# Patient Record
Sex: MALE | Race: WHITE | NOT HISPANIC OR LATINO | Employment: FULL TIME | URBAN - NONMETROPOLITAN AREA
[De-identification: names, ages, dates, MRNs, and addresses within clinical notes are randomized per-mention and may not be internally consistent; named-entity substitution may affect disease eponyms.]

---

## 2018-01-02 ENCOUNTER — OFFICE VISIT CONVERTED (OUTPATIENT)
Dept: FAMILY MEDICINE CLINIC | Age: 48
End: 2018-01-02
Attending: NURSE PRACTITIONER

## 2018-06-28 ENCOUNTER — OFFICE VISIT CONVERTED (OUTPATIENT)
Dept: FAMILY MEDICINE CLINIC | Age: 48
End: 2018-06-28
Attending: NURSE PRACTITIONER

## 2018-09-24 ENCOUNTER — OFFICE VISIT CONVERTED (OUTPATIENT)
Dept: FAMILY MEDICINE CLINIC | Age: 48
End: 2018-09-24
Attending: FAMILY MEDICINE

## 2018-12-07 ENCOUNTER — OFFICE VISIT CONVERTED (OUTPATIENT)
Dept: FAMILY MEDICINE CLINIC | Age: 48
End: 2018-12-07
Attending: FAMILY MEDICINE

## 2018-12-13 ENCOUNTER — OFFICE VISIT CONVERTED (OUTPATIENT)
Dept: FAMILY MEDICINE CLINIC | Age: 48
End: 2018-12-13
Attending: NURSE PRACTITIONER

## 2019-03-21 ENCOUNTER — OFFICE VISIT CONVERTED (OUTPATIENT)
Dept: FAMILY MEDICINE CLINIC | Age: 49
End: 2019-03-21
Attending: NURSE PRACTITIONER

## 2019-04-13 ENCOUNTER — HOSPITAL ENCOUNTER (OUTPATIENT)
Dept: OTHER | Facility: HOSPITAL | Age: 49
Discharge: HOME | End: 2019-04-13

## 2019-04-13 LAB
ALBUMIN SERPL-MCNC: 4.4 G/DL (ref 3.5–5)
ALBUMIN/GLOB SERPL: 1.5 {RATIO} (ref 1.4–2.6)
ALP SERPL-CCNC: 75 U/L (ref 53–128)
ALT SERPL-CCNC: 74 U/L (ref 10–40)
ANION GAP SERPL CALC-SCNC: 17 MMOL/L (ref 8–19)
AST SERPL-CCNC: 39 U/L (ref 15–50)
BASOPHILS # BLD MANUAL: 0.01 10*3/UL (ref 0–0.2)
BASOPHILS NFR BLD MANUAL: 0.2 % (ref 0–3)
BILIRUB SERPL-MCNC: 0.73 MG/DL (ref 0.2–1.3)
BUN SERPL-MCNC: 13 MG/DL (ref 5–25)
BUN/CREAT SERPL: 14 {RATIO} (ref 6–20)
CALCIUM SERPL-MCNC: 9.1 MG/DL (ref 8.7–10.4)
CHLORIDE SERPL-SCNC: 99 MMOL/L (ref 99–111)
CHOLEST SERPL-MCNC: 204 MG/DL (ref 107–200)
CHOLEST/HDLC SERPL: 4 {RATIO} (ref 3–6)
CONV CO2: 26 MMOL/L (ref 22–32)
CONV TOTAL PROTEIN: 7.3 G/DL (ref 6.3–8.2)
CREAT UR-MCNC: 0.9 MG/DL (ref 0.7–1.2)
DEPRECATED RDW RBC AUTO: 38.8 FL
EOSINOPHIL # BLD MANUAL: 0.07 10*3/UL (ref 0–0.7)
EOSINOPHIL NFR BLD MANUAL: 1.5 % (ref 0–7)
ERYTHROCYTE [DISTWIDTH] IN BLOOD BY AUTOMATED COUNT: 12.6 % (ref 11.5–14.5)
EST. AVERAGE GLUCOSE BLD GHB EST-MCNC: 169 MG/DL
GFR SERPLBLD BASED ON 1.73 SQ M-ARVRAT: >60 ML/MIN/{1.73_M2}
GLOBULIN UR ELPH-MCNC: 2.9 G/DL (ref 2–3.5)
GLUCOSE SERPL-MCNC: 174 MG/DL (ref 70–99)
GRANS (ABSOLUTE): 2.26 10*3/UL (ref 2–8)
GRANS: 47.4 % (ref 30–85)
HBA1C MFR BLD: 14 G/DL (ref 14–18)
HBA1C MFR BLD: 7.5 % (ref 3.5–5.7)
HCT VFR BLD AUTO: 41.8 % (ref 42–52)
HDLC SERPL-MCNC: 51 MG/DL (ref 40–60)
IMM GRANULOCYTES # BLD: 0.02 10*3/UL (ref 0–0.54)
IMM GRANULOCYTES NFR BLD: 0.4 % (ref 0–0.43)
LDLC SERPL CALC-MCNC: 124 MG/DL (ref 70–100)
LYMPHOCYTES # BLD MANUAL: 1.94 10*3/UL (ref 1–5)
LYMPHOCYTES NFR BLD MANUAL: 9.7 % (ref 3–10)
MCH RBC QN AUTO: 28.3 PG (ref 27–31)
MCHC RBC AUTO-ENTMCNC: 33.5 G/DL (ref 33–37)
MCV RBC AUTO: 84.4 FL (ref 80–96)
MONOCYTES # BLD AUTO: 0.46 10*3/UL (ref 0.2–1.2)
OSMOLALITY SERPL CALC.SUM OF ELEC: 288 MOSM/KG (ref 273–304)
PLATELET # BLD AUTO: 243 10*3/UL (ref 130–400)
PMV BLD AUTO: 11 FL (ref 7.4–10.4)
POTASSIUM SERPL-SCNC: 4.5 MMOL/L (ref 3.5–5.3)
RBC # BLD AUTO: 4.95 10*6/UL (ref 4.7–6.1)
SODIUM SERPL-SCNC: 137 MMOL/L (ref 135–147)
TRIGL SERPL-MCNC: 146 MG/DL (ref 40–150)
TSH SERPL-ACNC: 1.6 M[IU]/L (ref 0.27–4.2)
VARIANT LYMPHS NFR BLD MANUAL: 40.8 % (ref 20–45)
VLDLC SERPL-MCNC: 29 MG/DL (ref 5–37)
WBC # BLD AUTO: 4.76 10*3/UL (ref 4.8–10.8)

## 2019-07-30 ENCOUNTER — OFFICE VISIT CONVERTED (OUTPATIENT)
Dept: FAMILY MEDICINE CLINIC | Age: 49
End: 2019-07-30
Attending: NURSE PRACTITIONER

## 2019-08-28 ENCOUNTER — HOSPITAL ENCOUNTER (OUTPATIENT)
Dept: OTHER | Facility: HOSPITAL | Age: 49
Discharge: HOME | End: 2019-08-28

## 2019-08-28 LAB
ALBUMIN SERPL-MCNC: 4.5 G/DL (ref 3.5–5)
ALBUMIN/GLOB SERPL: 1.9 {RATIO} (ref 1.4–2.6)
ALP SERPL-CCNC: 92 U/L (ref 53–128)
ALT SERPL-CCNC: 74 U/L (ref 10–40)
ANION GAP SERPL CALC-SCNC: 19 MMOL/L (ref 8–19)
AST SERPL-CCNC: 40 U/L (ref 15–50)
BASOPHILS # BLD MANUAL: 0.03 10*3/UL (ref 0–0.2)
BASOPHILS NFR BLD MANUAL: 0.5 % (ref 0–3)
BILIRUB SERPL-MCNC: 0.3 MG/DL (ref 0.2–1.3)
BUN SERPL-MCNC: 14 MG/DL (ref 5–25)
BUN/CREAT SERPL: 19 {RATIO} (ref 6–20)
CALCIUM SERPL-MCNC: 9.3 MG/DL (ref 8.7–10.4)
CHLORIDE SERPL-SCNC: 102 MMOL/L (ref 99–111)
CHOLEST SERPL-MCNC: 177 MG/DL (ref 107–200)
CHOLEST/HDLC SERPL: 3.5 {RATIO} (ref 3–6)
CONV CO2: 23 MMOL/L (ref 22–32)
CONV CREATININE URINE, RANDOM: 141.7 MG/DL (ref 10–300)
CONV MICROALBUM.,U,RANDOM: <12 MG/L (ref 0–20)
CONV TOTAL PROTEIN: 6.9 G/DL (ref 6.3–8.2)
CREAT UR-MCNC: 0.74 MG/DL (ref 0.7–1.2)
DEPRECATED RDW RBC AUTO: 40.1 FL
EOSINOPHIL # BLD MANUAL: 0.09 10*3/UL (ref 0–0.7)
EOSINOPHIL NFR BLD MANUAL: 1.6 % (ref 0–7)
ERYTHROCYTE [DISTWIDTH] IN BLOOD BY AUTOMATED COUNT: 13 % (ref 11.5–14.5)
EST. AVERAGE GLUCOSE BLD GHB EST-MCNC: 186 MG/DL
GFR SERPLBLD BASED ON 1.73 SQ M-ARVRAT: >60 ML/MIN/{1.73_M2}
GLOBULIN UR ELPH-MCNC: 2.4 G/DL (ref 2–3.5)
GLUCOSE SERPL-MCNC: 186 MG/DL (ref 70–99)
GRANS (ABSOLUTE): 2.38 10*3/UL (ref 2–8)
GRANS: 41.8 % (ref 30–85)
HBA1C MFR BLD: 13.9 G/DL (ref 14–18)
HBA1C MFR BLD: 8.1 % (ref 3.5–5.7)
HCT VFR BLD AUTO: 41.4 % (ref 42–52)
HDLC SERPL-MCNC: 51 MG/DL (ref 40–60)
IMM GRANULOCYTES # BLD: 0.02 10*3/UL (ref 0–0.54)
IMM GRANULOCYTES NFR BLD: 0.4 % (ref 0–0.43)
LDLC SERPL CALC-MCNC: 97 MG/DL (ref 70–100)
LYMPHOCYTES # BLD MANUAL: 2.56 10*3/UL (ref 1–5)
LYMPHOCYTES NFR BLD MANUAL: 10.6 % (ref 3–10)
MCH RBC QN AUTO: 28 PG (ref 27–31)
MCHC RBC AUTO-ENTMCNC: 33.6 G/DL (ref 33–37)
MCV RBC AUTO: 83.3 FL (ref 80–96)
MICROALBUMIN/CREAT UR: 8.5 MG/G{CRE} (ref 0–25)
MONOCYTES # BLD AUTO: 0.6 10*3/UL (ref 0.2–1.2)
OSMOLALITY SERPL CALC.SUM OF ELEC: 293 MOSM/KG (ref 273–304)
PLATELET # BLD AUTO: 245 10*3/UL (ref 130–400)
PMV BLD AUTO: 11.7 FL (ref 7.4–10.4)
POTASSIUM SERPL-SCNC: 4.5 MMOL/L (ref 3.5–5.3)
RBC # BLD AUTO: 4.97 10*6/UL (ref 4.7–6.1)
SODIUM SERPL-SCNC: 139 MMOL/L (ref 135–147)
TRIGL SERPL-MCNC: 144 MG/DL (ref 40–150)
TSH SERPL-ACNC: 2.81 M[IU]/L (ref 0.27–4.2)
VARIANT LYMPHS NFR BLD MANUAL: 45.1 % (ref 20–45)
VLDLC SERPL-MCNC: 29 MG/DL (ref 5–37)
WBC # BLD AUTO: 5.68 10*3/UL (ref 4.8–10.8)

## 2019-12-10 ENCOUNTER — OFFICE VISIT CONVERTED (OUTPATIENT)
Dept: FAMILY MEDICINE CLINIC | Age: 49
End: 2019-12-10
Attending: NURSE PRACTITIONER

## 2019-12-13 ENCOUNTER — HOSPITAL ENCOUNTER (OUTPATIENT)
Dept: OTHER | Facility: HOSPITAL | Age: 49
Discharge: HOME | End: 2019-12-13
Attending: NURSE PRACTITIONER

## 2019-12-13 LAB
ALBUMIN SERPL-MCNC: 4.4 G/DL (ref 3.5–5)
ALBUMIN/GLOB SERPL: 1.7 {RATIO} (ref 1.4–2.6)
ALP SERPL-CCNC: 77 U/L (ref 53–128)
ALT SERPL-CCNC: 97 U/L (ref 10–40)
ANION GAP SERPL CALC-SCNC: 19 MMOL/L (ref 8–19)
AST SERPL-CCNC: 45 U/L (ref 15–50)
BILIRUB SERPL-MCNC: 0.47 MG/DL (ref 0.2–1.3)
BUN SERPL-MCNC: 13 MG/DL (ref 5–25)
BUN/CREAT SERPL: 17 {RATIO} (ref 6–20)
CALCIUM SERPL-MCNC: 9.4 MG/DL (ref 8.7–10.4)
CHLORIDE SERPL-SCNC: 101 MMOL/L (ref 99–111)
CHOLEST SERPL-MCNC: 177 MG/DL (ref 107–200)
CHOLEST/HDLC SERPL: 3.6 {RATIO} (ref 3–6)
CONV CO2: 23 MMOL/L (ref 22–32)
CONV TOTAL PROTEIN: 7 G/DL (ref 6.3–8.2)
CREAT UR-MCNC: 0.76 MG/DL (ref 0.7–1.2)
EST. AVERAGE GLUCOSE BLD GHB EST-MCNC: 197 MG/DL
GFR SERPLBLD BASED ON 1.73 SQ M-ARVRAT: >60 ML/MIN/{1.73_M2}
GLOBULIN UR ELPH-MCNC: 2.6 G/DL (ref 2–3.5)
GLUCOSE SERPL-MCNC: 212 MG/DL (ref 70–99)
HBA1C MFR BLD: 8.5 % (ref 3.5–5.7)
HDLC SERPL-MCNC: 49 MG/DL (ref 40–60)
LDLC SERPL CALC-MCNC: 107 MG/DL (ref 70–100)
OSMOLALITY SERPL CALC.SUM OF ELEC: 292 MOSM/KG (ref 273–304)
POTASSIUM SERPL-SCNC: 4.8 MMOL/L (ref 3.5–5.3)
SODIUM SERPL-SCNC: 138 MMOL/L (ref 135–147)
TRIGL SERPL-MCNC: 103 MG/DL (ref 40–150)
VLDLC SERPL-MCNC: 21 MG/DL (ref 5–37)

## 2020-10-30 ENCOUNTER — OFFICE VISIT CONVERTED (OUTPATIENT)
Dept: FAMILY MEDICINE CLINIC | Age: 50
End: 2020-10-30
Attending: NURSE PRACTITIONER

## 2020-10-30 ENCOUNTER — HOSPITAL ENCOUNTER (OUTPATIENT)
Dept: OTHER | Facility: HOSPITAL | Age: 50
Discharge: HOME | End: 2020-10-30
Attending: NURSE PRACTITIONER

## 2020-11-04 LAB — SARS-COV-2 RNA SPEC QL NAA+PROBE: DETECTED

## 2020-11-09 ENCOUNTER — HOSPITAL ENCOUNTER (OUTPATIENT)
Dept: OTHER | Facility: HOSPITAL | Age: 50
Discharge: HOME | End: 2020-11-09
Attending: FAMILY MEDICINE

## 2020-11-12 LAB — SARS-COV-2 RNA SPEC QL NAA+PROBE: NOT DETECTED

## 2020-11-13 ENCOUNTER — HOSPITAL ENCOUNTER (OUTPATIENT)
Dept: OTHER | Facility: HOSPITAL | Age: 50
Discharge: HOME | End: 2020-11-13
Attending: NURSE PRACTITIONER

## 2020-11-13 ENCOUNTER — OFFICE VISIT CONVERTED (OUTPATIENT)
Dept: FAMILY MEDICINE CLINIC | Age: 50
End: 2020-11-13
Attending: NURSE PRACTITIONER

## 2020-11-13 LAB
25(OH)D3 SERPL-MCNC: 37.1 NG/ML (ref 30–100)
ALBUMIN SERPL-MCNC: 3.8 G/DL (ref 3.5–5)
ALBUMIN/GLOB SERPL: 1.3 {RATIO} (ref 1.4–2.6)
ALP SERPL-CCNC: 87 U/L (ref 53–128)
ALT SERPL-CCNC: 97 U/L (ref 10–40)
ANION GAP SERPL CALC-SCNC: 16 MMOL/L (ref 8–19)
AST SERPL-CCNC: 55 U/L (ref 15–50)
BILIRUB SERPL-MCNC: 0.39 MG/DL (ref 0.2–1.3)
BUN SERPL-MCNC: 7 MG/DL (ref 5–25)
BUN/CREAT SERPL: 10 {RATIO} (ref 6–20)
CALCIUM SERPL-MCNC: 9.6 MG/DL (ref 8.7–10.4)
CHLORIDE SERPL-SCNC: 102 MMOL/L (ref 99–111)
CHOLEST SERPL-MCNC: 132 MG/DL (ref 107–200)
CHOLEST/HDLC SERPL: 3.5 {RATIO} (ref 3–6)
CONV CO2: 25 MMOL/L (ref 22–32)
CONV CREATININE URINE, RANDOM: 189.4 MG/DL (ref 10–300)
CONV MICROALBUM.,U,RANDOM: <12 MG/L (ref 0–20)
CONV TOTAL PROTEIN: 6.8 G/DL (ref 6.3–8.2)
CREAT UR-MCNC: 0.69 MG/DL (ref 0.7–1.2)
EST. AVERAGE GLUCOSE BLD GHB EST-MCNC: 278 MG/DL
GFR SERPLBLD BASED ON 1.73 SQ M-ARVRAT: >60 ML/MIN/{1.73_M2}
GLOBULIN UR ELPH-MCNC: 3 G/DL (ref 2–3.5)
GLUCOSE SERPL-MCNC: 171 MG/DL (ref 70–99)
HBA1C MFR BLD: 11.3 % (ref 3.5–5.7)
HDLC SERPL-MCNC: 38 MG/DL (ref 40–60)
LDLC SERPL CALC-MCNC: 71 MG/DL (ref 70–100)
MICROALBUMIN/CREAT UR: 6.3 MG/G{CRE} (ref 0–25)
OSMOLALITY SERPL CALC.SUM OF ELEC: 288 MOSM/KG (ref 273–304)
POTASSIUM SERPL-SCNC: 4.6 MMOL/L (ref 3.5–5.3)
SODIUM SERPL-SCNC: 138 MMOL/L (ref 135–147)
TRIGL SERPL-MCNC: 116 MG/DL (ref 40–150)
VLDLC SERPL-MCNC: 23 MG/DL (ref 5–37)

## 2021-01-04 ENCOUNTER — HOSPITAL ENCOUNTER (OUTPATIENT)
Dept: OTHER | Facility: HOSPITAL | Age: 51
Discharge: HOME | End: 2021-01-04
Attending: NURSE PRACTITIONER

## 2021-01-05 ENCOUNTER — OFFICE VISIT CONVERTED (OUTPATIENT)
Dept: FAMILY MEDICINE CLINIC | Age: 51
End: 2021-01-05

## 2021-01-21 ENCOUNTER — HOSPITAL ENCOUNTER (OUTPATIENT)
Dept: OTHER | Facility: HOSPITAL | Age: 51
Discharge: HOME | End: 2021-01-21
Attending: NURSE PRACTITIONER

## 2021-02-24 ENCOUNTER — HOSPITAL ENCOUNTER (OUTPATIENT)
Dept: OTHER | Facility: HOSPITAL | Age: 51
Discharge: HOME | End: 2021-02-24
Attending: NURSE PRACTITIONER

## 2021-02-24 ENCOUNTER — OFFICE VISIT CONVERTED (OUTPATIENT)
Dept: FAMILY MEDICINE CLINIC | Age: 51
End: 2021-02-24
Attending: NURSE PRACTITIONER

## 2021-02-24 LAB
ALBUMIN SERPL-MCNC: 4.6 G/DL (ref 3.5–5)
ALBUMIN/GLOB SERPL: 1.6 {RATIO} (ref 1.4–2.6)
ALP SERPL-CCNC: 66 U/L (ref 53–128)
ALT SERPL-CCNC: 45 U/L (ref 10–40)
ANION GAP SERPL CALC-SCNC: 17 MMOL/L (ref 8–19)
AST SERPL-CCNC: 29 U/L (ref 15–50)
BILIRUB SERPL-MCNC: 0.53 MG/DL (ref 0.2–1.3)
BUN SERPL-MCNC: 15 MG/DL (ref 5–25)
BUN/CREAT SERPL: 21 {RATIO} (ref 6–20)
CALCIUM SERPL-MCNC: 10 MG/DL (ref 8.7–10.4)
CHLORIDE SERPL-SCNC: 98 MMOL/L (ref 99–111)
CHOLEST SERPL-MCNC: 167 MG/DL (ref 107–200)
CHOLEST/HDLC SERPL: 3.2 {RATIO} (ref 3–6)
CONV CO2: 24 MMOL/L (ref 22–32)
CONV TOTAL PROTEIN: 7.4 G/DL (ref 6.3–8.2)
CREAT UR-MCNC: 0.73 MG/DL (ref 0.7–1.2)
EST. AVERAGE GLUCOSE BLD GHB EST-MCNC: 157 MG/DL
GFR SERPLBLD BASED ON 1.73 SQ M-ARVRAT: >60 ML/MIN/{1.73_M2}
GLOBULIN UR ELPH-MCNC: 2.8 G/DL (ref 2–3.5)
GLUCOSE SERPL-MCNC: 122 MG/DL (ref 70–99)
HBA1C MFR BLD: 7.1 % (ref 3.5–5.7)
HDLC SERPL-MCNC: 52 MG/DL (ref 40–60)
LDLC SERPL CALC-MCNC: 92 MG/DL (ref 70–100)
OSMOLALITY SERPL CALC.SUM OF ELEC: 280 MOSM/KG (ref 273–304)
POTASSIUM SERPL-SCNC: 4.6 MMOL/L (ref 3.5–5.3)
SODIUM SERPL-SCNC: 134 MMOL/L (ref 135–147)
TRIGL SERPL-MCNC: 117 MG/DL (ref 40–150)
VLDLC SERPL-MCNC: 23 MG/DL (ref 5–37)

## 2021-05-18 NOTE — PROGRESS NOTES
Kaylee Moe SIMMONSMat 1970     Office/Outpatient Visit    Visit Date: Thu, Dec 13, 2018 05:20 pm    Provider: Matilda Fowler N.P. (Assistant: Eva Fisher MA)    Location: [facility]        Electronically signed by Matilda Fowler N.P. on  12/17/2018 02:31:58 PM                             SUBJECTIVE:        CC:     Balta is a 48 year old White male.  presents today due to complaints of bilateral ear pain, cough, chest congestion X 1 week, DM follow up         HPI:         Balta presents with acute sinusitis, maxillary.  Pt states productive yellow cough, ear pain, chest congestion x 1 week. States no meds taken. He tried to get over it himself but symptoms persisted.          Additionally, he presents with history of type 2 diabetes.  pt states seeing Dr. Clinton. He checked his A1C and was elevated. Pt was called and told to change his meds. However, pt states the increase was due to eating halloween candy and continueing after. States his work kept a bowl around and he would grab pieces. He understands now that eating several pieces thru the day can significanly increase his blood sugar. He would like to change his eating habits and get his levels down with dietary changes before changing meds.      ROS:     CONSTITUTIONAL:  Negative for chills, fatigue, fever and weight change.      CARDIOVASCULAR:  Negative for chest pain, orthopnea, paroxysmal nocturnal dyspnea and pedal edema.      RESPIRATORY:  Negative for dyspnea and cough.      GASTROINTESTINAL:  Negative for abdominal pain, heartburn, constipation, diarrhea, and stool changes.      NEUROLOGICAL:  Negative for dizziness, headaches, paresthesias, and weakness.      PSYCHIATRIC:  Negative for anxiety and depression.          PMH/FM/SH:     Last Reviewed on 12/13/2018 05:33 PM by Matilda Fowler    Past Medical History:             PAST MEDICAL HISTORY         Positive for    Enlarged aorta;         PREVENTIVE HEALTH MAINTENANCE              EYE EXAM: was last done 2017 Dr. Wynn         Surgical History:         RTC repair ;    L foot fracture repair 99;         Family History:     Father: Hypertension     Mother: Type 2 Diabetes     Daughter(s): Healthy; 1 daughter(s) total     Paternal Grandfather: Healthy;      Paternal Grandmother: Healthy;      Maternal Grandfather: Healthy;      Maternal Grandmother: Healthy;          Social History:     Occupation: [employer];     Marital Status:      Children: 1 child     Hobbies/Recreation: he enjoys walking and weight lifting;     Exercise: Primary form of exercise is weight lifting.   Frequency is 4-6 days per week.          Tobacco/Alcohol/Supplements:     Last Reviewed on 2018 05:33 PM by Matilda Fowler    Tobacco: He has a past history of cigarette smoking; quit date:  .          Substance Abuse History:     Last Reviewed on 2018 05:33 PM by Matilda Fowler        Mental Health History:     Last Reviewed on 2018 05:33 PM by Matilda Fowler        Communicable Diseases (eg STDs):     Last Reviewed on 2018 05:33 PM by Matilda Fowler            Current Problems:     Last Reviewed on 2018 05:33 PM by Matilda Fowler    Vitamin D deficiency, unspecified     Fatigue     Screening for hyperlipidemia     MATHEW     Hypertension     Type 2 diabetes     Acute sinusitis, maxillary     Acute bronchitis         Immunizations:     Boostrix (Tdap) 2018         Allergies:     Last Reviewed on 2018 05:33 PM by Matilda Fowler      No Known Drug Allergies.         Current Medications:     Last Reviewed on 2018 05:33 PM by Matilda Fowler    Metformin HCl 1,000mg Tablet 1 tab bid     Glipizide 5mg Tablets 1 tablet tid     Proventil HFA 90mcg/1actuation Oral Inhaler Inhale 2 puff(s) by mouth q 4 to 6 hr     Lisinopril 40mg Tablet 1 tab daily     Amlodipine  5mg Tablet 1 tab daily     multi  vit with iron daily     Flonase Allergy Relief 50mcg/1actuation Nasal Spray 1 spray eac nostril once daily x 2 weeks then once daily prn allergy symptoms     Mucinex 600mg Tablets, Extended Release 1 bid prn         OBJECTIVE:        Vitals:         Current: 12/13/2018 5:24:43 PM    Ht:  5 ft, 11 in;  Wt: 312 lbs;  BMI: 43.5    T: 98 F (oral);  BP: 132/91 mm Hg (left arm, sitting);  P: 101 bpm (left arm (BP Cuff), sitting);  sCr: 0.93 mg/dL;  GFR: 122.24        Exams:     PHYSICAL EXAM:     GENERAL: Vitals recorded well developed, well nourished;  well groomed;  no apparent distress;     EYES: lids and lacrimal system are normal in appearance; extraocular movements intact; conjunctiva and cornea are normal; PERRLA;     E/N/T: NOSE: nasal mucosa is erythematous;  bilateral maxillary and bilateral frontal sinus tenderness present; OROPHARYNX: oral mucosa reveals erythema;     NECK:  supple, full ROM; no thyromegaly; no carotid bruits;     RESPIRATORY: normal respiratory rate and pattern with no distress; normal breath sounds with no rales, rhonchi, wheezes or rubs;     CARDIOVASCULAR: normal rate; rhythm is regular;  normal S1; normal S2; no systolic murmur; no cyanosis; no edema;     GASTROINTESTINAL: nontender, nondistended; no hepatosplenomegaly or masses; no bruits;     SKIN:  no significant rashes or lesions; no suspicious moles;     MUSCULOSKELETAL:  Normal range of motion, strength and tone;     NEUROLOGICAL:  cranial nerves, motor and sensory function, reflexes, gait and coordination are all intact;     PSYCHIATRIC:  appropriate affect and demeanor; normal speech pattern; grossly normal memory;         ASSESSMENT:           461.0   J01.00  Acute sinusitis, maxillary              DDx:     250.00   E11.8  Type 2 diabetes              DDx:         ORDERS:         Meds Prescribed:       Bromfed-DM (Brompheniramine/Dextromethorphan/Pseudoephedrine) Syrup 1  to 2  tsp po every 4-6 hrs prn cough/congestion.  #240  (Two [place]azy and Forty) ml Refills: 0       Augmentin (Amoxicillin/Clavulanate)  875mg/125mg Tablet 1 tab bid X 10 days  #20 (Twenty) tablet(s) Refills: 0       Glucose Reagent Blood Test Strips (Glucose Reagent Blood Test Strips)  Reagent Strips Check blood sugar 1-2 times per day E11.9  #100 (One Chazy) strip(s) Refills: 0                 PLAN:          Acute sinusitis, maxillary           Prescriptions:       Bromfed-DM (Brompheniramine/Dextromethorphan/Pseudoephedrine) Syrup 1  to 2  tsp po every 4-6 hrs prn cough/congestion.  #240 (Two [place]azy and Forty) ml Refills: 0       Augmentin (Amoxicillin/Clavulanate)  875mg/125mg Tablet 1 tab bid X 10 days  #20 (Twenty) tablet(s) Refills: 0          Type 2 diabetes Monitor blood sugar levels, return log as discussed in 2-3 weeks for review.           Prescriptions:       Glucose Reagent Blood Test Strips (Glucose Reagent Blood Test Strips)  Reagent Strips Check blood sugar 1-2 times per day E11.9  #100 (One Chazy) strip(s) Refills: 0             CHARGE CAPTURE:           Primary Diagnosis:     461.0 Acute sinusitis, maxillary            J01.00    Acute maxillary sinusitis, unspecified              Orders:          79843   Office/outpatient visit; established patient, level 4  (In-House)           250.00 Type 2 diabetes            E11.8    Type 2 diabetes mellitus with unspecified complications

## 2021-05-18 NOTE — PROGRESS NOTES
Moe Page  1970     Office/Outpatient Visit    Visit Date: Wed, Feb 24, 2021 10:29 am    Provider: Rochelle Louis N.P. (Assistant: Joselyn Rodarte, RN)    Location: [facility]        Electronically signed by Rochelle Louis N.P. on  02/27/2021 08:30:47 PM                             Subjective:        CC: Balta is a 50 year old White male.  3 month follow up         HPI:       managed by cardiology with last visit jan 2021.  had recent ct scan to revaluate aorta.  states bp is controlled.          In regard to the mixed hyperlipidemia, current treatment includes Zocor and a low cholesterol/low fat diet.  Compliance with treatment has been good; he takes his medication as directed.  He denies experiencing any hypercholesterolemia related symptoms.        fatty liver noted.  due for recheck.          Additionally, he presents with history of type 2 diabetes mellitus with hyperglycemia.  current meds include januvia, metformin, glipizide.  Most recent lab results include glycohemoglobin 11.3%.  With regard to the type 2 diabetes mellitus with hyperglycemia, compliance with treatment has been good; he takes his medication as directed.  He reports home blood glucose readings have been fairly good, with average fasting glucoses running in the 120-150 mg/dL range.      ROS:     CONSTITUTIONAL:  Negative for chills, fatigue, fever, and weight change.      CARDIOVASCULAR:  Negative for chest pain, palpitations, tachycardia, orthopnea, and edema.      RESPIRATORY:  Negative for cough, dyspnea, and hemoptysis.      MUSCULOSKELETAL:  Negative for arthralgias, back pain, and myalgias.      NEUROLOGICAL:  Negative for dizziness, headaches, paresthesias, and weakness.      PSYCHIATRIC:  Negative for anxiety, depression, and sleep disturbances.          Past Medical History / Family History / Social History:         Last Reviewed on 2/24/2021 10:38 AM by Rochelle Louis    Past Medical History:              PAST MEDICAL HISTORY     covid 19  oct 2020     Positive for    Sleep Apnea: (+) sleep study; ;     Positive for    Enlarged aorta;         CURRENT MEDICAL PROVIDERS:    Cardiologist: thomas cartwright         PREVENTIVE HEALTH MAINTENANCE             EYE EXAM: was last done 2019 dr mahan- record requested.          Surgical History:         RTC repair ;    L foot fracture repair 99;         Family History:     Father: Hypertension     Mother: Type 2 Diabetes     Daughter(s): Healthy; 1 daughter(s) total     Paternal Grandfather: Healthy;      Paternal Grandmother: Healthy;      Maternal Grandfather: Healthy;      Maternal Grandmother: Healthy;          Social History:     Occupation: [employer];     Marital Status:      Children: 1 child     Hobbies/Recreation: he enjoys walking and weight lifting;     Exercise: Primary form of exercise is weight lifting.   Frequency is 4-6 days per week.          Tobacco/Alcohol/Supplements:     Last Reviewed on 2021 10:31 AM by Joselyn Rodarte    Tobacco: He has a past history of cigarette smoking; quit date:  .          Substance Abuse History:     Last Reviewed on 2019 02:57 PM by Matilda Fowler        Mental Health History:     Last Reviewed on 2019 02:57 PM by Matilda Fowler        Communicable Diseases (eg STDs):     Last Reviewed on 2019 02:57 PM by Matilda Fowler        Current Problems:     Last Reviewed on 2021 01:01 PM by Rochelle Louis    Obstructive sleep apnea (adult) (pediatric)    Essential (primary) hypertension    Vitamin D deficiency, unspecified    Mixed hyperlipidemia    Abnormal results of liver function studies    Type 2 diabetes mellitus with hyperglycemia        Immunizations:     Boostrix (Tdap) 2018        Allergies:     Last Reviewed on 2021 02:31 PM by Dang Burns    No Known Allergies.        Current Medications:     Last Reviewed on  1/05/2021 02:31 PM by Dang Burns    Lisinopril 40mg Tablet [1 tab daily]    metFORMIN 1,000 mg oral tablet [1 tab bid]    Amlodipine  5 mg oral tablet [1 tab daily]    glipiZIDE 5 mg oral tablet [1 tablet 1 time daily]    Blood Glucose Test strips  [ Use Lotus Contour Next strips to test once daily  DX E11.9]    multi vit with iron daily     Proventil HFA 90mcg/1actuation Oral Inhaler [Inhale 2 puff(s) by mouth q 4 to 6 hr]    simvastatin 20 mg oral tablet [TAKE 1 TABLET BY MOUTH EVERY DAY]    Januvia 100 mg oral tablet [take 1 tablet (100 mg) by oral route once daily]    Accu-Chek Pat strips  [Test blood sugar once daily DX E11.9]    blood sugar diagnostic kit  [Use Accu Check Meter DX E11.9]    promethazine 6.25 mg/5 mL oral Syrup [take 10 milliliters (12.5 mg) by oral route 3 times per day as needed for nausea]        Objective:        Vitals:         Historical:     1/5/2021  BP:   119/73 mm Hg ( (left arm, , sitting, );) 1/5/2021  Wt:   292.6lbs    Current: 2/24/2021 10:34:24 AM    Ht:  5 ft, 11 in;  Wt: 284.4 lbs;  WC: 49 inches;  BMI: 39.7T: 97.5 F (temporal);  BP: 118/66 mm Hg (left arm, sitting);  P: 78 bpm (left arm (BP Cuff), sitting);  sCr: 0.69 mg/dL;  GFR: 155.12        Exams:     PHYSICAL EXAM:     GENERAL: obese;  no apparent distress;     RESPIRATORY: normal respiratory rate and pattern with no distress; normal breath sounds with no rales, rhonchi, wheezes or rubs;     CARDIOVASCULAR: normal rate; rhythm is regular;     MUSCULOSKELETAL:  Normal range of motion, strength and tone;     NEUROLOGIC: mental status: alert and oriented x 3; GROSSLY INTACT     PSYCHIATRIC:  appropriate affect and demeanor; normal speech pattern; grossly normal memory;         Assessment:         I10   Essential (primary) hypertension       E78.2   Mixed hyperlipidemia       R94.5   Abnormal results of liver function studies       E11.65   Type 2 diabetes mellitus with hyperglycemia           ORDERS:         Meds  Prescribed:       [Refilled] metFORMIN 1,000 mg oral tablet [1 tab bid], #180 (one hundred and eighty) tablets, Refills: 1 (one)       [Refilled] glipiZIDE 5 mg oral tablet [1 tablet 1 time daily], #90 (ninety) tablets, Refills: 1 (one)       [Refilled] Januvia 100 mg oral tablet [take 1 tablet (100 mg) by oral route once daily], #90 (ninety) tablets, Refills: 1 (one)       [Refilled] simvastatin 20 mg oral tablet [TAKE 1 TABLET BY MOUTH EVERY DAY], #90 (ninety) tablets, Refills: 1 (one)         Lab Orders:       [id number]  [facility] [place] [address] LIPID,CMP, A1C: 39050, 76090, 34855  (Send-Out)              Other Orders:       DLEYE  Dilated Eye Exam  (Send-Out)                      Plan:         Essential (primary) hypertensioncontinue per cardiology        RECOMMENDATIONS given include: perform routine monitoring of blood pressure with home blood pressure cuff, reduction of dietary salt intake, and take medication as prescribed, try not to miss doses.  MIPS Vaccines Flu and Pneumonia updated in Shot record         Mixed hyperlipidemia        RECOMMENDATIONS given include: exercise and low cholesterol/low fat diet.            Prescriptions:       [Refilled] simvastatin 20 mg oral tablet [TAKE 1 TABLET BY MOUTH EVERY DAY], #90 (ninety) tablets, Refills: 1 (one)         Abnormal results of liver function studiescmp pending.        Type 2 diabetes mellitus with hyperglycemia    LABORATORY:  Labs ordered to be performed today include Diabetes Panel 1; CMP, Lipid, A1C.      RECOMMENDATIONS: adherance to Low carb, NCS diet diet, daily foot self-inspection, yearly dental exams, and annual eye exams.      COUNSELING: The patient was counseled concerning the relationship between diabetes control and macrovascular disease including cardiovascular, cerebrovascular and peripheral vascular disease. The patient was counseled concerning the relationship between diabetes control and retinopathy, nephropathy, and neuropathy. The A1c target of <7%  according to ADA and <6.5% according to AACE were discussed.            Prescriptions:       [Refilled] metFORMIN 1,000 mg oral tablet [1 tab bid], #180 (one hundred and eighty) tablets, Refills: 1 (one)       [Refilled] glipiZIDE 5 mg oral tablet [1 tablet 1 time daily], #90 (ninety) tablets, Refills: 1 (one)       [Refilled] Januvia 100 mg oral tablet [take 1 tablet (100 mg) by oral route once daily], #90 (ninety) tablets, Refills: 1 (one)           Orders:       [id number]  [address] LIPID,CMP, A1C: 20551, 98236, 41944  (Send-Out)            DLEYE  Dilated Eye Exam  (Send-Out)                  Patient Recommendations:        For  Essential (primary) hypertension:    Begin monitoring your blood pressure by brief nurse visits at our office, a home blood pressure monitor, or by checking on the machines in pharmacies or stores.  Keep a log of the readings. Reduce the amount of salt in your diet.          For  Mixed hyperlipidemia:    Maintain a regular exercise program. Reduce the amount of cholesterol and saturated fat in your diet.          For  Type 2 diabetes mellitus with hyperglycemia:    Follow a diabetic diet as directed. Examine your feet daily.  Small cuts and injuries often go unnoticed and can lead to serious infections. Have an annual dental exam. Have an annual eye exam.              Charge Capture:         Primary Diagnosis:     I10  Essential (primary) hypertension           Orders:      [id number]  Office/outpatient visit; established patient, level 4  (In-House)              E78.2  Mixed hyperlipidemia     R94.5  Abnormal results of liver function studies     E11.65  Type 2 diabetes mellitus with hyperglycemia

## 2021-05-18 NOTE — PROGRESS NOTES
Kaylee Moe SIMMONSMat 1970     Office/Outpatient Visit    Visit Date: Tue, Jul 30, 2019 02:28 pm    Provider: Matilda Fowler N.P. (Assistant: Eva Fisher MA)    Location: [facility]        Electronically signed by Matilda Fowler N.P. on  08/02/2019 12:15:20 PM                             SUBJECTIVE:        CC:     Balta is a 48 year old White male.  Patient presents today for physical exam         HPI:         Health checkup noted.  A chest x-ray was done it was normal.   His last ECG was was normal.   He's had vision screening done 1 year ago.  He does not perform regular testicular self-exams.  He is not current with his influenza immunization.      Smoking Status:  Nonsmoker         PHQ-9 Depression Screening: Completed form scanned and in chart; Total Score 0 Alcohol Consumption Screening: Completed form scanned and in chart; Total Score 1         Additionally, he presents with history of type 2 diabetes.  pt states not checking his blood sugar. He is watching is foods and taking his medicine daily.          MATHEW: Pt states doing well and compliant with tx.          Vit D deficiency: Pt states not taking Vit D. He got busy and forgot.      ROS:     CONSTITUTIONAL:  Negative for chills, fatigue, fever and weight change.      CARDIOVASCULAR:  Negative for chest pain, orthopnea, paroxysmal nocturnal dyspnea and pedal edema.      RESPIRATORY:  Negative for dyspnea and cough.      GASTROINTESTINAL:  Negative for abdominal pain, heartburn, constipation, diarrhea, and stool changes.      PSYCHIATRIC:  Negative for anxiety and depression.          PMH/FMH/SH:     Last Reviewed on 7/30/2019 02:57 PM by Matilda Fowler    Past Medical History:             PAST MEDICAL HISTORY         Positive for    Enlarged aorta;         PREVENTIVE HEALTH MAINTENANCE             EYE EXAM: was last done 5- 2017 Dr. Wynn         Surgical History:         RTC repair 2006;    L foot fracture repair 8-7-99;          Family History:     Father: Hypertension     Mother: Type 2 Diabetes     Daughter(s): Healthy; 1 daughter(s) total     Paternal Grandfather: Healthy;      Paternal Grandmother: Healthy;      Maternal Grandfather: Healthy;      Maternal Grandmother: Healthy;          Social History:     Occupation: [employer];     Marital Status:      Children: 1 child     Hobbies/Recreation: he enjoys walking and weight lifting;     Exercise: Primary form of exercise is weight lifting.   Frequency is 4-6 days per week.          Tobacco/Alcohol/Supplements:     Last Reviewed on 2019 02:57 PM by Matilda Fowler    Tobacco: He has a past history of cigarette smoking; quit date:  .          Substance Abuse History:     Last Reviewed on 2019 02:57 PM by Matilda Fowler        Mental Health History:     Last Reviewed on 2019 02:57 PM by Matilda Fowler        Communicable Diseases (eg STDs):     Last Reviewed on 2019 02:57 PM by Matilda Fowler            Current Problems:     Last Reviewed on 2019 02:57 PM by Matilda Fowler    Vitamin D deficiency, unspecified     Screening for hyperlipidemia     MATHEW     Hypertension     Type 2 diabetes     Screening for depression         Immunizations:     Boostrix (Tdap) 2018         Allergies:     Last Reviewed on 2019 02:57 PM by Matilda Fowler      No Known Drug Allergies.         Current Medications:     Last Reviewed on 2019 02:57 PM by Matilda Fowler    Glipizide 5mg Tablets 1 tablet tid     Metformin HCl 1,000mg Tablet 1 tab bid     Proventil HFA 90mcg/1actuation Oral Inhaler Inhale 2 puff(s) by mouth q 4 to 6 hr     Lisinopril 40mg Tablet 1 tab daily     Amlodipine  5mg Tablet 1 tab daily     multi vit with iron daily         OBJECTIVE:        Vitals:         Current: 2019 2:32:31 PM    Ht:  5 ft, 11 in;  Wt: 303.4 lbs;  BMI: 42.3    T: 98.7 F (oral);  BP: 129/72 mm Hg (right  arm, sitting);  P: 89 bpm (right arm (BP Cuff), sitting);  sCr: 0.9 mg/dL;  GFR: 124.83        Exams:     PHYSICAL EXAM:     GENERAL: Vitals recorded well developed, well nourished;  well groomed;  no apparent distress;     EYES: lids and lacrimal system are normal in appearance; extraocular movements intact; conjunctiva and cornea are normal; PERRLA;     E/N/T:  normal EACs, TMs, nasal/oral mucosa, teeth, gingiva, and oropharynx;     NECK:  supple, full ROM; no thyromegaly; no carotid bruits;     RESPIRATORY: normal respiratory rate and pattern with no distress; normal breath sounds with no rales, rhonchi, wheezes or rubs;     CARDIOVASCULAR: normal rate; rhythm is regular;  normal S1; normal S2; no systolic murmur; no cyanosis; no edema;     GASTROINTESTINAL: nontender, nondistended; no hepatosplenomegaly or masses; no bruits;     SKIN:  no significant rashes or lesions; no suspicious moles;     MUSCULOSKELETAL:  Normal range of motion, strength and tone;     NEUROLOGICAL:  cranial nerves, motor and sensory function, reflexes, gait and coordination are all intact;     PSYCHIATRIC:  appropriate affect and demeanor; normal speech pattern; grossly normal memory;     Left foot exam    Protective sensation using Monofilament test: NORMAL sensation. Patient detects .07 grams of force which is considered normal.    Vascular status: normal peripheral vascular exam with palpable dorsal pedal and posterior tibal pulses and brisk digital capillary refill    Skin is intact without sores or ulcers    Right foot exam    Protective sensation using Monofilament test: NORMAL sensation. Patient detects .07 grams of force which is considered normal.    Vascular status: normal peripheral vascular exam with palpable dorsal pedal and posterior tibal pulses and brisk digital capillary refill    Skin is intact without sores or ulcers         ASSESSMENT:           V70.0   Z00.00  Health checkup              DDx:     V79.0   Z13.31   Screening for depression              DDx:     250.00   E11.8  Type 2 diabetes              DDx:     780.57   G47.33  MATHEW              DDx:     268.9   E55.9  Vitamin D deficiency, unspecified              DDx:         ORDERS:         Lab Orders:       [id number]  CECIL - [facility] Microablbumin, quantitative  (Send-Out)         85384  PHYSF - [facility] PHYSICAL: CMP, CBC, TSH, LIPID: [id number], [id number], [id number], 79552  (Send-Out)         74260  A1CEG - [facility] Hemoglobin A1C  (Send-Out)           Other Orders:       2028F  Foot examination performed (includes examination through visual inspection, sensory exam with monofi  (In-House)           Depression screen negative  (In-House)           Negative EtOH screen  (In-House)                   PLAN: Pt start testicular exam, explanation given.          Health checkup     LABORATORY:  Labs ordered to be performed today include Microalbumin and PHYSICAL PANEL; CMP, CBC, TSH, LIPID.  MIPS Negative Depression Screen Negative alcohol screen           Orders:       00999  CECIL - [facility] Microablbumin, quantitative  (Send-Out)         64478  PHYSF - [facility] PHYSICAL: CMP, CBC, TSH, LIPID: [id number], [id number], [id number], 26495  (Send-Out)           Depression screen negative  (In-House)           Negative EtOH screen  (In-House)            Type 2 diabetes     LABORATORY:  Labs ordered to be performed today include HgbA1C.            Orders:       [id number]  A1CEG - [facility] Hemoglobin A1C  (Send-Out)               Other Orders:       2028F  Foot examination performed (includes examination through visual inspection, sensory exam with monofi  (In-House)           CHARGE CAPTURE:           Primary Diagnosis:     V70.0 Health checkup            Z00.00    Encounter for general adult medical examination without abnormal findings              Orders:          20898   Preventive medicine, established patient, age 40-64 years  (In-House)                Depression screen negative  (In-House)                 Negative EtOH screen  (In-House)           V79.0 Screening for depression            Z13.31    Encounter for screening for depression              Orders:          44155 -25  Office/outpatient visit; established patient, level 3  (In-House)           250.00 Type 2 diabetes            E11.8    Type 2 diabetes mellitus with unspecified complications    780.57 MATHEW            G47.33    Obstructive sleep apnea (adult) (pediatric)    268.9 Vitamin D deficiency, unspecified            E55.9    Vitamin D deficiency, unspecified        Other Orders:           2028F   Foot examination performed (includes examination through visual inspection, sensory exam with monofi  (In-House)

## 2021-05-18 NOTE — PROGRESS NOTES
Moe Page 1970     Office/Outpatient Visit    Visit Date: Thu, Mar 21, 2019 04:43 pm    Provider: Matilda Fowler N.P. (Assistant: Alessandra Tipton MA)    Location: [facility]        Electronically signed by Matilda Fowler N.P. on  2019 02:30:27 PM                             SUBJECTIVE:        CC:     Balta is a 48 year old White male.  This is a follow-up visit.  chronics         HPI:         Balta presents with hypertension.  States doing well on med. No refills needed.          With regard to the type 2 diabetes, states doing well on med. Here for f/u. States a few times with feeling his blood sugar is low and will take it and it's 180. It's time for an A1C.          MATHEW: Pt compliant with cpap     ROS:     CONSTITUTIONAL:  Negative for chills, fatigue and fever.      CARDIOVASCULAR:  Negative for chest pain, orthopnea, paroxysmal nocturnal dyspnea and pedal edema.      RESPIRATORY:  Negative for dyspnea and cough.      GASTROINTESTINAL:  Negative for abdominal pain, heartburn, constipation, diarrhea, and stool changes.      NEUROLOGICAL:  Negative for dizziness, headaches, paresthesias, and weakness.      PSYCHIATRIC:  Negative for anxiety and depression.          PMH/FMH/SH:     Last Reviewed on 3/21/2019 05:28 PM by Matilda Fowler    Past Medical History:             PAST MEDICAL HISTORY         Positive for    Enlarged aorta;         PREVENTIVE HEALTH MAINTENANCE             EYE EXAM: was last done 2017 Dr. Wynn         Surgical History:         RTC repair ;    L foot fracture repair 99;         Family History:     Father: Hypertension     Mother: Type 2 Diabetes     Daughter(s): Healthy; 1 daughter(s) total     Paternal Grandfather: Healthy;      Paternal Grandmother: Healthy;      Maternal Grandfather: Healthy;      Maternal Grandmother: Healthy;          Social History:     Occupation: [employer];      Marital Status:      Children: 1 child     Hobbies/Recreation: he enjoys walking and weight lifting;     Exercise: Primary form of exercise is weight lifting.   Frequency is 4-6 days per week.          Tobacco/Alcohol/Supplements:     Last Reviewed on 3/21/2019 05:28 PM by Matilda Fowler    Tobacco: He has a past history of cigarette smoking; quit date:  1996.          Substance Abuse History:     Last Reviewed on 3/21/2019 05:28 PM by Matilda Fowler        Mental Health History:     Last Reviewed on 3/21/2019 05:28 PM by Matilda Fowler        Communicable Diseases (eg STDs):     Last Reviewed on 3/21/2019 05:28 PM by Matilda Fowler            Current Problems:     Last Reviewed on 3/21/2019 05:28 PM by Matilda Fowler    Vitamin D deficiency, unspecified     Fatigue     Screening for hyperlipidemia     MATHEW     Hypertension     Type 2 diabetes     Acute sinusitis, maxillary     Acute bronchitis         Immunizations:     Boostrix (Tdap) 9/24/2018         Allergies:     Last Reviewed on 3/21/2019 05:28 PM by Matilda Fowler      No Known Drug Allergies.         Current Medications:     Last Reviewed on 3/21/2019 05:28 PM by Matilda Fowler    Metformin HCl 1,000mg Tablet 1 tab bid     Glipizide 5mg Tablets 1 tablet tid     Proventil HFA 90mcg/1actuation Oral Inhaler Inhale 2 puff(s) by mouth q 4 to 6 hr     Lisinopril 40mg Tablet 1 tab daily     Amlodipine  5mg Tablet 1 tab daily     multi vit with iron daily         OBJECTIVE:        Vitals:         Current: 3/21/2019 4:49:24 PM    Ht:  5 ft, 11 in;  Wt: 308.6 lbs;  BMI: 43.0    T: 98 F (oral);  BP: 125/75 mm Hg (right arm, sitting);  P: 87 bpm (right arm (BP Cuff), sitting);  sCr: 0.93 mg/dL;  GFR: 121.67        Exams:     PHYSICAL EXAM:     GENERAL: Vitals recorded well developed, well nourished;  well groomed;  no apparent distress;     EYES: lids and lacrimal system are normal in appearance; extraocular movements intact; conjunctiva and  cornea are normal; PERRLA;     NECK:  supple, full ROM; no thyromegaly; no carotid bruits;     RESPIRATORY: normal respiratory rate and pattern with no distress; normal breath sounds with no rales, rhonchi, wheezes or rubs;     CARDIOVASCULAR: normal rate; rhythm is regular;  normal S1; normal S2; no systolic murmur; no cyanosis; no edema;     GASTROINTESTINAL: nontender, nondistended; no hepatosplenomegaly or masses; no bruits;     SKIN:  no significant rashes or lesions; no suspicious moles;     MUSCULOSKELETAL:  Normal range of motion, strength and tone;     NEUROLOGICAL:  cranial nerves, motor and sensory function, reflexes, gait and coordination are all intact;     PSYCHIATRIC:  appropriate affect and demeanor; normal speech pattern; grossly normal memory;         ASSESSMENT:           401.1   I10  Hypertension              DDx:     250.00   E11.8  Type 2 diabetes              DDx:     780.57   G47.33  MATHEW              DDx:         ORDERS:         Lab Orders:       [id number]  [facility] PHYSICAL: CMP, CBC, TSH, LIPID: [id number], [id number], 76091, 74178  (Send-Out)  (Pt to return)       FUTURE  Future order to be done at patients convenience  (Send-Out)         30[facility]  [facility]  ([facility]                   PLAN:          Hypertension     LABORATORY:  Labs ordered to be performed today include PHYSICAL PANEL; CMP, CBC, TSH, LIPID.            Orders:       [id number]  [facility] PHYSICAL: CMP, CBC, TSH, LIPID: [id number], [id number], [id number], 99414  (Send-Out)  (Pt to return)          Type 2 diabetes         FOLLOW-UP TESTING #1: FOLLOW-UP LABORATORY:  Labs to be scheduled in the future include HgbA1C.            Orders:       FUTURE  Future order to be done at patients convenience  (Send-Out)         20364  [facility]  (Send-Out)               Patient Recommendations:        For  Type 2 diabetes:             The following laboratory testing has been ordered: HgbA1C             CHARGE CAPTURE:            Primary Diagnosis:     401.1 Hypertension            I10    Essential (primary) hypertension              Orders:          [id number]   Office/outpatient visit; established patient, level 4  (In-House)           250.00 Type 2 diabetes            E11.8    Type 2 diabetes mellitus with unspecified complications    780.57 MATHEW            G47.33    Obstructive sleep apnea (adult) (pediatric)

## 2021-05-18 NOTE — PROGRESS NOTES
Moe Page 1970     Office/Outpatient Visit    Visit Date:  05:04 pm    Provider: Matilda Fowler N.P. (Assistant: Lucila Prieto MA)    Location: [facility]        Electronically signed by Matilda Fowler N.P. on  2018 12:19:40 PM                             SUBJECTIVE:        CC:     Balta is a 47 year old White male.  Patient is here for routine check up and medication refills.          HPI:         Balta presents with hypertension.  States doing well on med. Here for f/u and labs. He is unsure of meds needing refilled.          Concerning type 2 diabetes, pt states doing well on meds. His BS is usually around the 140's. He checks consistently. His last A1C in  was 7.1.          Vit D: Pt not currently supplementing, checking level.      ROS:     CONSTITUTIONAL:  Negative for chills, fatigue, fever and weight change.      CARDIOVASCULAR:  Negative for chest pain, orthopnea, paroxysmal nocturnal dyspnea and pedal edema.      RESPIRATORY:  Negative for dyspnea and cough.      GASTROINTESTINAL:  Negative for abdominal pain, heartburn, constipation, diarrhea, and stool changes.      NEUROLOGICAL:  Negative for dizziness, headaches, paresthesias, and weakness.      PSYCHIATRIC:  Negative for anxiety and depression.          PMH/FMH/SH:     Last Reviewed on 2018 05:27 PM by Matilda Fowler    Past Medical History:             PAST MEDICAL HISTORY         Positive for    Enlarged aorta;         PREVENTIVE HEALTH MAINTENANCE             EYE EXAM: was last done 2017 Dr. Wynn         Surgical History:         RTC repair ;    L foot fracture repair 99;         Family History:     Father: Hypertension     Mother: Type 2 Diabetes     Daughter(s): Healthy; 1 daughter(s) total     Paternal Grandfather: Healthy;      Paternal Grandmother: Healthy;      Maternal Grandfather: Healthy;      Maternal Grandmother: Healthy;           Social History:     Occupation: [employer];     Marital Status:      Children: 1 child     Hobbies/Recreation: he enjoys walking and weight lifting;     Exercise: Primary form of exercise is weight lifting.   Frequency is 4-6 days per week.          Tobacco/Alcohol/Supplements:     Last Reviewed on 2018 05:27 PM by Matilda Fowler    Tobacco: He has a past history of cigarette smoking; quit date:  .          Substance Abuse History:     Last Reviewed on 2018 05:27 PM by Matilda Fowler        Mental Health History:     Last Reviewed on 2018 05:27 PM by Matilda Fowler        Communicable Diseases (eg STDs):     Last Reviewed on 2018 05:27 PM by Matilda Fowler            Current Problems:     Last Reviewed on 2018 05:27 PM by Matilda Fowler    Vitamin D deficiency, unspecified     Fatigue     Screening for hyperlipidemia     MATHEW     Hypertension     Type 2 diabetes     Acute bronchitis         Immunizations:     None        Allergies:     Last Reviewed on 2018 05:27 PM by Matilda Fowler      No Known Drug Allergies.         Current Medications:     Last Reviewed on 2018 05:27 PM by Matilda Fowler    Metformin HCl 850mg Tablet Take 1 tablet(s) by mouth bid     Glipizide 5mg Tablets 1 tablet tid     Proventil HFA 90mcg/1actuation Oral Inhaler Inhale 2 puff(s) by mouth q 4 to 6 hr     Lisinopril 40mg Tablet 1 tab daily     Amlodipine  5mg Tablet 1 tab daily     multi vit with iron daily         OBJECTIVE:        Vitals:         Current: 2018 5:06:19 PM    Ht:  5 ft, 11 in;  Wt: 303.6 lbs;  BMI: 42.3    T: 99.7 F (oral);  BP: 118/71 mm Hg (left arm, sitting);  P: 81 bpm (left arm (BP Cuff), sitting);  sCr: 0.86 mg/dL;  GFR: 132.02        Exams:     PHYSICAL EXAM:     GENERAL: Vitals recorded well developed, well nourished;  well groomed;  no apparent distress;     EYES: lids and lacrimal system are normal in appearance;  extraocular movements intact; conjunctiva and cornea are normal; PERRLA;     NECK:  supple, full ROM; no thyromegaly; no carotid bruits;     RESPIRATORY: normal respiratory rate and pattern with no distress; normal breath sounds with no rales, rhonchi, wheezes or rubs;     CARDIOVASCULAR: normal rate; rhythm is regular;  normal S1; normal S2; no systolic murmur; no cyanosis; no edema;     GASTROINTESTINAL: nontender, nondistended; no hepatosplenomegaly or masses; no bruits;     SKIN:  no significant rashes or lesions; no suspicious moles;     MUSCULOSKELETAL:  Normal range of motion, strength and tone;     NEUROLOGICAL:  cranial nerves, motor and sensory function, reflexes, gait and coordination are all intact;     PSYCHIATRIC:  appropriate affect and demeanor; normal speech pattern; grossly normal memory;         ASSESSMENT:           401.1   I10  Hypertension              DDx:     250.00   E11.8  Type 2 diabetes              DDx:     268.9   E55.9  Vitamin D deficiency, unspecified              DDx:         ORDERS:         Lab Orders:       [id number]  [facility] CBC with 3 part diff  (Send-Out)         15858  COMP - [facility] Comp. Metabolic Panel  (Send-Out)         00648  A1CEG - [facility] Hemoglobin A1C  (Send-Out)         42146  LPDP - [facility] Lipid Panel  (Send-Out)         91716  TSH - [facility] TSH  (Send-Out)         07508  VITD - H Vitamin D, 25 Hydroxy  (Send-Out)         APPTO  Appointment need  (In-House)                   PLAN:          Type 2 diabetes     LABORATORY:  Labs ordered to be performed today include CBC, Comprehensive metabolic panel, HgbA1C, lipid panel, and TSH.      FOLLOW-UP: Schedule a follow-up visit in 6 months..   Chronic visit follow up           Orders:       [id number]  BDCBC - [facility] CBC with 3 part diff  (Send-Out)         47644  COMP - [facility] Comp. Metabolic Panel  (Send-Out)         90208  A1CEG - [facility] Hemoglobin A1C  (Send-Out)         98605  LPDP - [facility] Lipid Panel  (Send-Out)         79193  TSH - [facility] TSH   (Send-Out)         APPTO  Appointment need  (In-House)            Vitamin D deficiency, unspecified           Orders:       51188  VITD - [facility] Vitamin D, 25 Hydroxy  (Send-Out)               Patient Recommendations:        For  Type 2 diabetes:     Schedule a follow-up visit in 6 months.                APPOINTMENT INFORMATION:        Monday Tuesday Wednesday Thursday Friday Saturday Sunday            Time:___________________AM  PM   Date:_____________________             CHARGE CAPTURE:           Primary Diagnosis:     401.1 Hypertension            I10    Essential (primary) hypertension              Orders:          52154   Office/outpatient visit; established patient, level 3  (In-House)           250.00 Type 2 diabetes            E11.8    Type 2 diabetes mellitus with unspecified complications              Orders:          APPTO   Appointment need  (In-House)           268.9 Vitamin D deficiency, unspecified            E55.9    Vitamin D deficiency, unspecified

## 2021-05-18 NOTE — PROGRESS NOTES
Moe Page 1970     Office/Outpatient Visit    Visit Date: Tue, Jan 2, 2018 03:53 pm    Provider: Rochelle Louis N.P. (Assistant: Rahul Blas LPN)    Location: [facility]        Electronically signed by Rochelle Louis N.P. on  01/02/2018 06:29:58 PM                             SUBJECTIVE:        CC:     Balta is a 47 year old White male.  non-productive rattle-like cough, currently on atb, cough med, decreased appetite         HPI:         Balta presents with cough.  It has been present for the past 4 days.  Respiratory symptoms include progressive, dry cough, shortness of breath and wheezing.   He denies sinus pressure.  Other symptoms include nasal congestion and wheezing.  He denies body aches, fever or nasal discharge.  He has already tried to relieve the symptoms with amoxil, bromfed dm.      ROS:     CONSTITUTIONAL:  Positive for fatigue.   Negative for fever.      E/N/T:  Positive for nasal congestion.   Negative for ear pain, frequent rhinorrhea or sore throat.      CARDIOVASCULAR:  Negative for chest pain, palpitations, tachycardia, orthopnea, and edema.      RESPIRATORY:  Positive for recent cough ( typically dry ), dyspnea and frequent wheezing.      GASTROINTESTINAL:  Negative for abdominal pain, heartburn, constipation, diarrhea, and stool changes.      MUSCULOSKELETAL:  Negative for arthralgias, back pain, and myalgias.      NEUROLOGICAL:  Negative for dizziness, headaches, paresthesias, and weakness.          PMH/FMH/SH:     Last Reviewed on 12/28/2017 03:44 PM by Matilda Fowler    Past Medical History:             PAST MEDICAL HISTORY         Positive for    Enlarged aorta;         PREVENTIVE HEALTH MAINTENANCE             EYE EXAM: was last done 5- 2017 Dr. Wynn         Surgical History:         RTC repair 2006;    L foot fracture repair 8-7-99;         Family History:     Father: Hypertension     Mother: Type 2 Diabetes     Daughter(s): Healthy; 1  daughter(s) total     Paternal Grandfather: Healthy;      Paternal Grandmother: Healthy;      Maternal Grandfather: Healthy;      Maternal Grandmother: Healthy;          Social History:     Occupation: [employer];     Marital Status:      Children: 1 child     Hobbies/Recreation: he enjoys walking and weight lifting;     Exercise: Primary form of exercise is weight lifting.   Frequency is 4-6 days per week.          Tobacco/Alcohol/Supplements:     Last Reviewed on 2017 03:44 PM by Matilda Fowler    Tobacco: He has a past history of cigarette smoking; quit date:  .          Substance Abuse History:     Last Reviewed on 2017 03:44 PM by Matilda Fowler        Mental Health History:     Last Reviewed on 2017 03:44 PM by Matilda Fowler        Communicable Diseases (eg STDs):     Last Reviewed on 2017 03:44 PM by Matilda Fowler            Current Problems:     Last Reviewed on 2017 03:44 PM by Matilda Fowler    Vitamin D deficiency, unspecified     Fatigue     Screening for hyperlipidemia     MATHEW     Hypertension     Type 2 diabetes     Pharyngitis, viral     Sore throat     Acute bronchitis         Immunizations:     None        Allergies:     Last Reviewed on 2018 03:56 PM by Rahul Blas      No Known Drug Allergies.         Current Medications:     Last Reviewed on 2018 03:58 PM by Rahul Blas    Metformin HCl 850mg Tablet Take 1 tablet(s) by mouth bid     Glipizide 5mg Tablets 1 tablet tid     Proventil HFA 90mcg/1actuation Oral Inhaler Inhale 2 puff(s) by mouth q 4 to 6 hr     Lisinopril 40mg Tablet 1 tab daily     Amlodipine  5mg Tablet 1 tab daily     Amoxicillin 500mg Capsules 2 cap bid x 14 days     Bromfed-DM 2mg/10mg/30mg per 5ml Syrup 1  to 2  tsp po every 4-6 hrs prn cough/congestion.     Glucose Reagent Blood Test Strips  Reagent Strips Check blood sugar 1-2 times per day E11.9      multi vit with iron daily         OBJECTIVE:        Vitals:         Historical:     12/28/2017  BP:   148/89 mm Hg ( (left arm, , sitting, );)     12/28/2017  Wt:   312.8lbs        Current: 1/2/2018 3:55:58 PM    Ht:  5 ft, 11 in;  Wt: 309.3 lbs;  BMI: 43.1    T: 97.7 F (oral);  BP: 137/83 mm Hg (left arm, sitting);  P: 95 bpm (left arm (BP Cuff), sitting);  sCr: 0.86 mg/dL;  GFR: 133.07        Exams:     PHYSICAL EXAM:     GENERAL: no apparent distress;     E/N/T: EARS: the left TM is has fluid behind it and right TM is normal;  NOSE: nasal mucosa is erythematous;  no sinus tenderness; OROPHARYNX: posterior pharynx, including tonsils, tongue, and uvula are normal;     RESPIRATORY: normal respiratory rate and pattern with no distress; expiratory wheezes in the RUL and RLL;     CARDIOVASCULAR: normal rate; rhythm is regular;     LYMPHATIC: no enlargement of cervical or facial nodes;     MUSCULOSKELETAL:  Normal range of motion, strength and tone;     NEUROLOGIC: mental status: alert and oriented x 3; GROSSLY INTACT     PSYCHIATRIC:  appropriate affect and demeanor; normal speech pattern; grossly normal memory;         ASSESSMENT           466.0   J20.9  Acute bronchitis              DDx:         ORDERS:         Meds Prescribed:       Prednisone 5mg Tablet 8 pills day 1, 6 pills day 2, 4 pills on day three, 2 pills on day 4, and 1 pill on day 5  #21 (Twenty One) tablet(s) Refills: 0       Tessalon Perles (Benzonatate) 100mg Capsules One PO Q 8 hours PRN cough  #30 (Thirty) capsule(s) Refills: 0                 PLAN:          Acute bronchitis         RECOMMENDATIONS given include: rest, increase oral fluid intake, and stop bromfed dm (not effective).  tessalon may cause drowsiness.  follow up if not improving..            Prescriptions:       Prednisone 5mg Tablet 8 pills day 1, 6 pills day 2, 4 pills on day three, 2 pills on day 4, and 1 pill on day 5  #21 (Twenty One) tablet(s) Refills: 0       Tessalon Perles  (Benzonatate) 100mg Capsules One PO Q 8 hours PRN cough  #30 (Thirty) capsule(s) Refills: 0           Patient Education Handouts:       Acute Bronchitis              CHARGE CAPTURE           **Please note: ICD descriptions below are intended for billing purposes only and may not represent clinical diagnoses**        Primary Diagnosis:         466.0 Acute bronchitis            J20.9    Acute bronchitis, unspecified              Orders:          68453   Office/outpatient visit; established patient, level 3  (In-House)

## 2021-05-18 NOTE — PROGRESS NOTES
Moe Page  1970     Office/Outpatient Visit    Visit Date: Fri, Nov 13, 2020 08:33 am    Provider: Rochelle Louis N.P. (Assistant: Bouchra Foster RN)    Location: [facility]        Electronically signed by Rochelle Louis N.P. on  11/15/2020 05:57:41 PM                             Subjective:        CC: Balta is a 50 year old White male.  diabetes check.   Covid + 2 weeks ago         HPI:           Balta presents with type 2 diabetes mellitus without complications.  Compliance with treatment has been fair; he does not follow-up as directed.  He denies experiencing any diabetes related symptoms.  Current meds include glipizide, metformin.  Most recent lab results include glycohemoglobin 8.5%.  In regard to preventative care, his last ophthalmology exam was in 2019.            takes supplement intermittently.  due for recheck.  had covid 19 recently and feeling much better and is getting back to work.            tested at [facility] unknown date but about 5 yrs ago.  that provider retired.  cpap machine working well but needs new mask.            In regard to the essential (primary) hypertension, managed by cardiology with next appt dec 4.            Concerning mixed hyperlipidemia, current treatment includes Zocor.  Compliance with treatment has been fair.  He denies experiencing any hypercholesterolemia related symptoms.      ROS:     CONSTITUTIONAL:  Positive for fatigue.   Negative for chills or fever.      CARDIOVASCULAR:  Negative for chest pain, palpitations, tachycardia, orthopnea, and edema.      RESPIRATORY:  Negative for cough, dyspnea, and hemoptysis.      GASTROINTESTINAL:  Negative for abdominal pain, heartburn, constipation, diarrhea, and stool changes.      MUSCULOSKELETAL:  Negative for arthralgias, back pain, and myalgias.      NEUROLOGICAL:  Negative for dizziness, headaches, paresthesias, and weakness.      PSYCHIATRIC:  Negative for anxiety, depression, and sleep  disturbances.          Past Medical History / Family History / Social History:         Last Reviewed on 12/10/2019 04:29 PM by Rochelle Louis    Past Medical History:             PAST MEDICAL HISTORY     covid 19  oct 2020     Positive for    Sleep Apnea: (+) sleep study; ;     Positive for    Enlarged aorta;         CURRENT MEDICAL PROVIDERS:    Cardiologist: thomas cartwright         PREVENTIVE HEALTH MAINTENANCE             EYE EXAM: was last done 2019 dr mahan- record requested.          Surgical History:         RTC repair ;    L foot fracture repair 99;         Family History:     Father: Hypertension     Mother: Type 2 Diabetes     Daughter(s): Healthy; 1 daughter(s) total     Paternal Grandfather: Healthy;      Paternal Grandmother: Healthy;      Maternal Grandfather: Healthy;      Maternal Grandmother: Healthy;          Social History:     Occupation: [employer];     Marital Status:      Children: 1 child     Hobbies/Recreation: he enjoys walking and weight lifting;     Exercise: Primary form of exercise is weight lifting.   Frequency is 4-6 days per week.          Tobacco/Alcohol/Supplements:     Last Reviewed on 2020 08:36 AM by Bouchra Foster    Tobacco: He has a past history of cigarette smoking; quit date:  .          Substance Abuse History:     Last Reviewed on 2019 02:57 PM by Matilda Fowler        Mental Health History:     Last Reviewed on 2019 02:57 PM by Matilda Fowler        Communicable Diseases (eg STDs):     Last Reviewed on 2019 02:57 PM by Matilda Fowler        Current Problems:     Last Reviewed on 2020 09:28 AM by Rochelle Louis    Obstructive sleep apnea (adult) (pediatric)    Essential (primary) hypertension    Vitamin D deficiency, unspecified    Mixed hyperlipidemia    Type 2 diabetes mellitus without complications        Immunizations:     Boostrix (Tdap) 2018        Allergies:      Last Reviewed on 10/30/2020 01:32 PM by Berenice Mohan    No Known Allergies.        Current Medications:     Last Reviewed on 11/13/2020 08:44 AM by Bouchra Foster    Glipizide 5 mg oral tablet [1 tablet tid]    metFORMIN 1,000 mg oral tablet [1 tab bid]    Amlodipine  5mg Tablet [1 tab daily]    Lisinopril 40mg Tablet [1 tab daily]    multi vit with iron daily     Proventil HFA 90mcg/1actuation Oral Inhaler [Inhale 2 puff(s) by mouth q 4 to 6 hr]    simvastatin 20 mg oral tablet [TAKE 1 TABLET BY MOUTH EVERY DAY]        Objective:        Vitals:         Historical:     10/30/2020  BP:   142/94 mm Hg ( (left arm, , sitting, );) 10/30/2020  Wt:   300.8lbs12/10/2019  Wt:   315lbs    Current: 11/13/2020 8:35:33 AM    Ht:  5 ft, 11 in;  Wt: 290.6 lbs;  BMI: 40.5T: 96.5 F (temporal);  BP: 136/88 mm Hg (left arm, sitting);  P: 93 bpm (left arm (BP Cuff), sitting);  sCr: 0.76 mg/dL;  GFR: 142.13O2 Sat: 96 % (room air)        Exams:     PHYSICAL EXAM:     GENERAL: obese;  no apparent distress;     NECK: thyroid is non-palpable;     RESPIRATORY: normal respiratory rate and pattern with no distress; normal breath sounds with no rales, rhonchi, wheezes or rubs;     CARDIOVASCULAR: normal rate; rhythm is regular;     Peripheral Pulses: dorsalis pedis: 2+ amplitude, no bruits; posterior tibial: 2+ amplitude, no bruits; no edema;     MUSCULOSKELETAL:  Normal range of motion, strength and tone;     NEUROLOGIC: mental status: alert and oriented x 3; GROSSLY INTACT     PSYCHIATRIC:  appropriate affect and demeanor; normal speech pattern; grossly normal memory;     Left foot exam    Protective sensation using Monofilament test: NORMAL sensation. Patient detects .07 grams of force which is considered normal.    Vascular status: normal peripheral vascular exam with palpable dorsal pedal and posterior tibal pulses and brisk digital capillary refill    Skin is intact without sores or ulcers    Right foot exam    Protective sensation  using Monofilament test: NORMAL sensation. Patient detects .07 grams of force which is considered normal.    Vascular status: normal peripheral vascular exam with palpable dorsal pedal and posterior tibal pulses and brisk digital capillary refill    Skin is intact without sores or ulcers         Assessment:         E11.9   Type 2 diabetes mellitus without complications       E55.9   Vitamin D deficiency, unspecified       G47.33   Obstructive sleep apnea (adult) (pediatric)       I10   Essential (primary) hypertension       E78.2   Mixed hyperlipidemia           ORDERS:         Meds Prescribed:       [Refilled] metFORMIN 1,000 mg oral tablet [1 tab bid], #180 (one hundred and eighty) tablets, Refills: 1 (one)       [Refilled] Glipizide 5 mg oral tablet [1 tablet tid], #270 (two hundred and seventy) tablets, Refills: 1 (one)       [Refilled] simvastatin 20 mg oral tablet [TAKE 1 TABLET BY MOUTH EVERY DAY], #90 (ninety) tablets, Refills: 1 (one)         Lab Orders:       [id number]  DIAB2 - H CMP A1C LIPID AND MICRO ALBUM CR RATIO: [id number]  (Send-Out)            10177  VITD - [facility] Vitamin D, 25 Hydroxy  (Send-Out)              Other Orders:       2028F  Foot examination performed (includes examination through visual inspection, sensory exam with monofilament, and pulse exam - report when any of the three components are completed) (DM)4  (In-House)            DLEYE  Dilated Eye Exam  (Send-Out)                      Plan:         Type 2 diabetes mellitus without complications    LABORATORY:  Labs ordered to be performed today include Diabetes Panel 2;CMP, A1C, Lipid, Microalbumin:Creatinine Ratio.  MIPS Vaccines Flu and Pneumonia updated in Shot record     RECOMMENDATIONS: adherance to Low carb, NCS diet diet, annual monofilament test for evaluating sensation in feet, daily foot self-inspection, yearly dental exams, annual eye exams, and need for yearly flu shots.            Prescriptions:        [Refilled] metFORMIN 1,000 mg oral tablet [1 tab bid], #180 (one hundred and eighty) tablets, Refills: 1 (one)       [Refilled] Glipizide 5 mg oral tablet [1 tablet tid], #270 (two hundred and seventy) tablets, Refills: 1 (one)           Orders:       [id number]  DIAB2 - [facility] CMP A1C LIPID AND MICRO ALBUM CR RATIO: [id number]  (Send-Out)            DLEYE  Dilated Eye Exam  (Send-Out)              Vitamin D deficiency, unspecified    LABORATORY:  Labs ordered to be performed today include Vitamin D.            Orders:       30922  VITD - [facility] Vitamin D, 25 Hydroxy  (Send-Out)              Obstructive sleep apnea (adult) (pediatric)        RECOMMENDATIONS given include: written script for cpap tubing and supplies since current machine is working.  will likely need new referral for updated testing as it has been several yrs since last test.  also need someone to monitor machine data and determine if pressure adjustments need to be made..          Essential (primary) hypertension        RECOMMENDATIONS given include: perform routine monitoring of blood pressure with home blood pressure cuff and continue with cardiology..          Mixed hyperlipidemia          Prescriptions:       [Refilled] simvastatin 20 mg oral tablet [TAKE 1 TABLET BY MOUTH EVERY DAY], #90 (ninety) tablets, Refills: 1 (one)             Other Orders      2028F  Foot examination performed (includes examination through visual inspection, sensory exam with monofilament, and pulse exam - report when any of the three components are completed) (DM)4  (In-House)              Patient Recommendations:        For  Type 2 diabetes mellitus without complications:    Follow a diabetic diet as directed. Have an annual monofilament test to check for diabetes-related sensory nerve disturbance in the feet. Examine your feet daily.  Small cuts and injuries often go unnoticed and can lead to serious infections. Have an annual dental exam. Have an annual eye  exam. Obtain a flu shot each year.          For  Essential (primary) hypertension:    Begin monitoring your blood pressure by brief nurse visits at our office, a home blood pressure monitor, or by checking on the machines in pharmacies or stores.  Keep a log of the readings.              Charge Capture:         Primary Diagnosis:     E11.9  Type 2 diabetes mellitus without complications           Orders:      34648  Office/outpatient visit; established patient, level 4  (In-House)              E55.9  Vitamin D deficiency, unspecified     G47.33  Obstructive sleep apnea (adult) (pediatric)     I10  Essential (primary) hypertension     E78.2  Mixed hyperlipidemia         Other Orders:       2028F  Foot examination performed (includes examination through visual inspection, sensory exam with monofilament, and pulse exam - report when any of the three components are completed) (DM)4  (In-House)

## 2021-05-18 NOTE — PROGRESS NOTES
PageMoe gonsales IRIS  1970     Office/Outpatient Visit    Visit Date: Fri, Oct 30, 2020 01:25 pm    Provider: Rochelle Louis N.P. (Assistant: Berenice Mohan MA)    Location: [facility]        Electronically signed by Rochelle Louis N.P. on  10/31/2020 11:41:18 AM                             Subjective:        CC: Balta is a 50 year old White male.  off and on for a week, chills, nausea, fatigue, body aches, fever         HPI:           Patient to be evaluated for acute upper respiratory infection, unspecified.  These have been present for the past 4 days.  The symptoms include body aches, cough, nasal congestion and nasal discharge.  He denies fever or wheezing.  He reports recent exposure to illness from co-workers.  He has already tried to relieve the symptoms with acetaminophen.      ROS:     CONSTITUTIONAL:  Positive for fatigue.   Negative for fever.      E/N/T:  Positive for nasal congestion and frequent rhinorrhea.      RESPIRATORY:  Positive for recent cough.   Negative for dyspnea.      GASTROINTESTINAL:  Positive for nausea.   Negative for diarrhea or vomiting.      MUSCULOSKELETAL:  Positive for myalgias.          Past Medical History / Family History / Social History:         Last Reviewed on 12/10/2019 04:29 PM by Rochelle Louis    Past Medical History:             PAST MEDICAL HISTORY         Positive for    Enlarged aorta;         CURRENT MEDICAL PROVIDERS:    Cardiologist: thomas cartwright         PREVENTIVE HEALTH MAINTENANCE             EYE EXAM: was last done  dr mahan- record requested.          Surgical History:         RTC repair ;    L foot fracture repair 99;         Family History:     Father: Hypertension     Mother: Type 2 Diabetes     Daughter(s): Healthy; 1 daughter(s) total     Paternal Grandfather: Healthy;      Paternal Grandmother: Healthy;      Maternal Grandfather: Healthy;      Maternal Grandmother: Healthy;           Social History:     Occupation: [employer];     Marital Status:      Children: 1 child     Hobbies/Recreation: he enjoys walking and weight lifting;     Exercise: Primary form of exercise is weight lifting.   Frequency is 4-6 days per week.          Tobacco/Alcohol/Supplements:     Last Reviewed on 12/10/2019 04:07 PM by Beatrice Virk    Tobacco: He has a past history of cigarette smoking; quit date:  1996.          Substance Abuse History:     Last Reviewed on 7/30/2019 02:57 PM by Matilda Fowler        Mental Health History:     Last Reviewed on 7/30/2019 02:57 PM by Matilda Fowler        Communicable Diseases (eg STDs):     Last Reviewed on 7/30/2019 02:57 PM by Matilda Fowler        Current Problems:     Last Reviewed on 12/10/2019 04:29 PM by Rochelle Louis    Type 2 diabetes mellitus with unspecified complications    Obstructive sleep apnea (adult) (pediatric)    Essential (primary) hypertension    Vitamin D deficiency, unspecified    Mixed hyperlipidemia        Immunizations:     Boostrix (Tdap) 9/24/2018        Allergies:     Last Reviewed on 10/30/2020 01:32 PM by Berenice Mohan    No Known Allergies.        Current Medications:     Last Reviewed on 10/30/2020 01:32 PM by Berenice Mohan    Lisinopril 40mg Tablet [1 tab daily]    Glipizide 5 mg oral tablet [1 tablet tid]    metFORMIN 1,000 mg oral tablet [1 tab bid]    Amlodipine  5mg Tablet [1 tab daily]    multi vit with iron daily     Proventil HFA 90mcg/1actuation Oral Inhaler [Inhale 2 puff(s) by mouth q 4 to 6 hr]    simvastatin 20 mg oral tablet [TAKE 1 TABLET BY MOUTH EVERY DAY]        Objective:        Vitals:         Historical:     12/10/2019  BP:   132/84 mm Hg ( (right arm, , sitting, );) 12/10/2019  Wt:   252zly97/30/2019  Wt:   303.4lbs    Current: 10/30/2020 1:34:25 PM    Ht:  5 ft, 11 in;  Wt: 300.8 lbs;  BMI: 42.0T: 96.7 F (temporal);  BP: 142/94 mm Hg (left arm, sitting);  P: 83 bpm (left arm (BP Cuff),  sitting);  sCr: 0.76 mg/dL;  GFR: 144.23        Exams:     PHYSICAL EXAM:     GENERAL: obese;  no apparent distress;     E/N/T: EARS: bilateral TMs are normal;  OROPHARYNX: posterior pharynx, including tonsils, tongue, and uvula are normal;     RESPIRATORY: normal respiratory rate and pattern with no distress; normal breath sounds with no rales, rhonchi, wheezes or rubs;     CARDIOVASCULAR: normal rate; rhythm is regular;     LYMPHATIC: no enlargement of cervical or facial nodes;     MUSCULOSKELETAL:  Normal range of motion, strength and tone;     NEUROLOGIC: mental status: alert and oriented x 3; GROSSLY INTACT     PSYCHIATRIC:  appropriate affect and demeanor; normal speech pattern; grossly normal memory;         Lab/Test Results:         Influenza A and B: Negative (10/30/2020),     Performed by:: finesse (10/30/2020),             Assessment:         R50.9   Fever, unspecified       J06.9   Acute upper respiratory infection, unspecified           ORDERS:         Meds Prescribed:       [New Rx] promethazine-DM 6.25-15 mg/5 mL oral Syrup [take 5 milliliters by oral route every 4 hours as needed, not to exceed 30 mL in 24 hours], #118 (one hundred and eighteen) milliliters, Refills: 0 (zero)         Radiology/Test Orders:       [id number]  COVID 19 Testing  (Send-Out)              Lab Orders:       [id number]  Infectious agent antigen detection by immunoassay; Influenza  (In-House)            39700  Infectious agent antigen detection by immunoassay; Influenza  (In-House)                      Plan:         Fever, unspecified    LABORATORY:  Labs ordered to be performed today include COVID 19 Testing.            Orders:       [id number]  Infectious agent antigen detection by immunoassay; Influenza  (In-House)            23984  Infectious agent antigen detection by immunoassay; Influenza  (In-House)            46792  COVID 19 Testing  (Send-Out)              Acute upper respiratory infection, unspecified        RECOMMENDATIONS given  include: rest, increase oral fluid intake, and promethazine dm may cause drowsiness.  follow up if not improving.  self quarantine until results rec'd..            Prescriptions:       [New Rx] promethazine-DM 6.25-15 mg/5 mL oral Syrup [take 5 milliliters by oral route every 4 hours as needed, not to exceed 30 mL in 24 hours], #118 (one hundred and eighteen) milliliters, Refills: 0 (zero)             Charge Capture:         Primary Diagnosis:     R50.9  Fever, unspecified           Orders:      25358  Office/outpatient visit; established patient, level 3  (In-House)            15939  Infectious agent antigen detection by immunoassay; Influenza  (In-House)            20791  Infectious agent antigen detection by immunoassay; Influenza  (In-House)              J06.9  Acute upper respiratory infection, unspecified

## 2021-05-18 NOTE — PROGRESS NOTES
Moe Page 1970     Office/Outpatient Visit    Visit Date: Mon, Sep 24, 2018 09:47 am    Provider: Greg Clinton,   (Assistant: Nicolasa Tripp MA)    Location: [facility]        Electronically signed by Greg Clinton,   on  09/28/2018 07:54:09 AM                             SUBJECTIVE:        CC:     Balta is a 48 year old White male.  He presents with smashed finger on Right hand last night, lac through tip of finger & the nail.          HPI:     Last night patient was loading a speaker and crushed his right fifth digit (pinky) finger in the process. There is a split of distal finger that extends senior care into the nailbed. Unsure when last tetanus shot was.  Ran tap water over it and wrapped in band-aid. Applied neosporin.     ROS:     CONSTITUTIONAL:  Negative for fatigue and fever.      EYES:  Negative for blurred vision.      E/N/T:  Negative for diminished hearing and nasal congestion.      CARDIOVASCULAR:  Negative for chest pain and palpitations.      RESPIRATORY:  Negative for recent cough and dyspnea.      GASTROINTESTINAL:  Negative for abdominal pain, constipation, diarrhea, nausea and vomiting.      MUSCULOSKELETAL:  Positive for limb pain ( right upper extremity pain ).   Negative for arthralgias or myalgias.      INTEGUMENTARY:  Positive for right finger laceration.      NEUROLOGICAL:  Negative for paresthesias and weakness.      PSYCHIATRIC:  Negative for anxiety, depression and sleep disturbance.          PMH/FMH/SH:     Last Reviewed on 6/28/2018 05:27 PM by Matilda Fowler    Past Medical History:             PAST MEDICAL HISTORY         Positive for    Enlarged aorta;         PREVENTIVE HEALTH MAINTENANCE             EYE EXAM: was last done 5- 2017 Dr. Wynn         Surgical History:         RTC repair 2006;    L foot fracture repair 8-7-99;         Family History:     Father: Hypertension     Mother: Type 2 Diabetes     Daughter(s): Healthy; 1 daughter(s)  total     Paternal Grandfather: Healthy;      Paternal Grandmother: Healthy;      Maternal Grandfather: Healthy;      Maternal Grandmother: Healthy;          Social History:     Occupation: [employer];     Marital Status:      Children: 1 child     Hobbies/Recreation: he enjoys walking and weight lifting;     Exercise: Primary form of exercise is weight lifting.   Frequency is 4-6 days per week.          Tobacco/Alcohol/Supplements:     Last Reviewed on 2018 05:27 PM by Matilda Fowler    Tobacco: He has a past history of cigarette smoking; quit date:  .          Substance Abuse History:     Last Reviewed on 2018 05:27 PM by Matilda Fowler        Mental Health History:     Last Reviewed on 2018 05:27 PM by Matilda Fowler        Communicable Diseases (eg STDs):     Last Reviewed on 2018 05:27 PM by Matilda Fowler            Current Problems:     Last Reviewed on 2018 05:27 PM by Matilda Fowler    Vitamin D deficiency, unspecified     Fatigue     Screening for hyperlipidemia     MATHEW     Hypertension     Type 2 diabetes     Acute bronchitis         Immunizations:     None        Allergies:     Last Reviewed on 2018 05:27 PM by Matilda Fowler      No Known Drug Allergies.         Current Medications:     Last Reviewed on 2018 05:27 PM by Matilda Fowler    Metformin HCl 850mg Tablet Take 1 tablet(s) by mouth bid     Glipizide 5mg Tablets 1 tablet tid     Proventil HFA 90mcg/1actuation Oral Inhaler Inhale 2 puff(s) by mouth q 4 to 6 hr     Lisinopril 40mg Tablet 1 tab daily     Amlodipine  5mg Tablet 1 tab daily     multi vit with iron daily         OBJECTIVE:        Vitals:         Current: 2018 9:52:41 AM    Ht:  5 ft, 11 in;  Wt: 305.4 lbs;  BMI: 42.6    T: 98.6 F (oral);  BP: 122/79 mm Hg (right arm, sitting);  P: 78 bpm (right arm (BP Cuff), sitting);  sCr: 0.86 mg/dL;  GFR: 131.00        Exams:     PHYSICAL  EXAM:     GENERAL: Vitals recorded well developed, well nourished;  well groomed;  no apparent distress;     EYES: lids and lacrimal system are normal in appearance; extraocular movements intact; conjunctiva and cornea are normal; PERRLA;     RESPIRATORY: normal respiratory rate and pattern with no distress; normal breath sounds with no rales, rhonchi, wheezes or rubs;     CARDIOVASCULAR: normal rate; rhythm is regular;  normal S1; normal S2; no systolic murmur; no cyanosis; no edema;     GASTROINTESTINAL: nontender, nondistended; no hepatosplenomegaly or masses; no bruits;     SKIN: there is a verticle laceration from mid nail extending to tip of finger of right, fifth digit.;     MUSCULOSKELETAL:  Normal range of motion, strength and tone;     PSYCHIATRIC:  appropriate affect and demeanor; normal speech pattern; grossly normal memory;         Procedures:     Tetanus-diphtheria-acellular pertussis immunization [Tdap]     1. Boostrix (Tdap): 0.5 ml unit dose given IM in the left upper arm; administered by kh             ASSESSMENT           927.3   S67.00XA   S67.10XA  Crushing injury of finger(s)              DDx:     V06.1   Z23  Tetanus-diphtheria-acellular pertussis immunization [Tdap]              DDx:         ORDERS:         Meds Prescribed:       Keflex (Cephalexin) 500mg Capsules Take 1 capsule(s) by mouth q12h for 10 days  #20 (Twenty) capsule(s) Refills: 0         Radiology/Test Orders:       [id number]  Right radiologic examination, finger(s); minimum of two views  (Send-Out)           Procedures Ordered:       [id number]  Tetanus, diphtheria toxoid and acellular pertussis vaccine (TdaP),  for use in individuals seven yea  (In-House)         23893  Immunization administration; one vaccine  (In-House)                   PLAN:          Crushing injury of finger(s)         RADIOLOGY:  I have ordered Finger x-ray x-ray of the 5th digit of the right hand to be done today.  Pt smashed his right pinky finger causing a  midline split from middle of nailbed to the tip of his finger. No sign of infection, pt given Tdap and keflex for infection prevention given depth of injury. X-ray shows no acute fracture. Pt advised to keep injury keep with tap water, neosporin ointment, and bandage.            Prescriptions:       Keflex (Cephalexin) 500mg Capsules Take 1 capsule(s) by mouth q12h for 10 days  #20 (Twenty) capsule(s) Refills: 0           Orders:       [id number]  Right radiologic examination, finger(s); minimum of two views  (Send-Out)            Tetanus-diphtheria-acellular pertussis immunization [Tdap]           Orders:       [id number]  Tetanus, diphtheria toxoid and acellular pertussis vaccine (TdaP),  for use in individuals seven yea  (In-House)         81550  Immunization administration; one vaccine  (In-House)               CHARGE CAPTURE           **Please note: ICD descriptions below are intended for billing purposes only and may not represent clinical diagnoses**        Primary Diagnosis:         927.3 Crushing injury of finger(s)            S67.00XA    Crushing injury of unspecified thumb, initial encounter           S67.10XA    Crushing injury of unspecified finger(s), initial encounter              Orders:          13464   Office/outpatient visit; established patient, level 3  (In-House)           V06.1 Tetanus-diphtheria-acellular pertussis immunization [Tdap]            Z23    Encounter for immunization              Orders:          [id number]   Tetanus, diphtheria toxoid and acellular pertussis vaccine (TdaP),  for use in individuals seven yea  (In-House)             19701   Immunization administration; one vaccine  (In-House)

## 2021-05-18 NOTE — PROGRESS NOTES
Kaylee Moe SIMMONSMat 1970     Office/Outpatient Visit    Visit Date: Fri, Dec 7, 2018 11:02 am    Provider: Greg Clinton MD (Assistant: Brooklyn Moreno MA)    Location: [facility]        Electronically signed by Greg Clinton MD on  12/12/2018 03:00:04 PM                             SUBJECTIVE:        CC:     Balta is a 48 year old White male.  presents today due to sinus symptoms         HPI:     Sx started about 3 days ago, scratchy throat, for the past 2 days sinus and nasal congestion, head feels achy, feels as if congestion is sitting on his chest. This morning coughed up a bunch of green phelgm. Felt feverish to his wife last night. Better with tylenol. Has been taking night and day thera-flu.     Checks blood sugar once a day at various times during the day and it typically runs in 140's. Drinks unsweet tea, diet dr pepper, or water. Eats sweets occassionally, get sugar free cough drops. He does eat carbs a fair amount.     ROS:     CONSTITUTIONAL:  Negative for fatigue and fever.      EYES:  Negative for blurred vision.      E/N/T:  Positive for nasal congestion, sinus pressure and sore throat.   Negative for diminished hearing.      CARDIOVASCULAR:  Negative for chest pain and palpitations.      RESPIRATORY:  Positive for recent cough ( with scant amounts of purulent sputum ).   Negative for dyspnea.      GASTROINTESTINAL:  Negative for abdominal pain, constipation, diarrhea, nausea and vomiting.      GENITOURINARY:  Negative for dysuria.      MUSCULOSKELETAL:  Negative for arthralgias and myalgias.      INTEGUMENTARY:  Negative for atypical mole(s) and rash.      NEUROLOGICAL:  Negative for paresthesias and weakness.      PSYCHIATRIC:  Negative for anxiety, depression and sleep disturbance.          PMH/FMH/SH:     Last Reviewed on 6/28/2018 05:27 PM by Matilda Fowler    Past Medical History:             PAST MEDICAL HISTORY         Positive for    Enlarged aorta;          PREVENTIVE HEALTH MAINTENANCE             EYE EXAM: was last done 2017 Dr. Wynn         Surgical History:         RTC repair ;    L foot fracture repair 99;         Family History:     Father: Hypertension     Mother: Type 2 Diabetes     Daughter(s): Healthy; 1 daughter(s) total     Paternal Grandfather: Healthy;      Paternal Grandmother: Healthy;      Maternal Grandfather: Healthy;      Maternal Grandmother: Healthy;          Social History:     Occupation: [employer];     Marital Status:      Children: 1 child     Hobbies/Recreation: he enjoys walking and weight lifting;     Exercise: Primary form of exercise is weight lifting.   Frequency is 4-6 days per week.          Tobacco/Alcohol/Supplements:     Last Reviewed on 2018 09:52 AM by Nicolasa Tripp    Tobacco: He has a past history of cigarette smoking; quit date:  .          Substance Abuse History:     Last Reviewed on 2018 05:27 PM by Matilda Fowler        Mental Health History:     Last Reviewed on 2018 05:27 PM by Matilda Fowler        Communicable Diseases (eg STDs):     Last Reviewed on 2018 05:27 PM by Matilda Fowler            Current Problems:     Last Reviewed on 2018 05:27 PM by Matilda Fowler    Vitamin D deficiency, unspecified     Fatigue     Screening for hyperlipidemia     MATHEW     Hypertension     Type 2 diabetes     Acute bronchitis         Immunizations:     Boostrix (Tdap) 2018         Allergies:     Last Reviewed on 2018 09:50 AM by Nicolasa Tripp      No Known Drug Allergies.         Current Medications:     Last Reviewed on 2018 09:50 AM by Nicolasa Tripp    Glipizide 5mg Tablets 1 tablet tid     Metformin HCl 850mg Tablet Take 1 tablet(s) by mouth bid     Proventil HFA 90mcg/1actuation Oral Inhaler Inhale 2 puff(s) by mouth q 4 to 6 hr     Lisinopril 40mg Tablet 1 tab daily     Amlodipine  5mg Tablet 1 tab daily      multi vit with iron daily         OBJECTIVE:        Vitals:         Current: 12/7/2018 11:07:46 AM    Ht:  5 ft, 11 in;  Wt: 313.6 lbs;  BMI: 43.7    T: 98.8 F (oral);  BP: 137/79 mm Hg (left arm, sitting);  P: 100 bpm (left arm (BP Cuff), sitting);  sCr: 0.86 mg/dL;  GFR: 132.48        Exams:     PHYSICAL EXAM:     GENERAL: Vitals recorded well developed, well nourished;  well groomed;  no apparent distress;     EYES: extraocular movements intact; conjunctiva and cornea are normal; PERRLA;     E/N/T: OROPHARYNX:  normal mucosa, dentition, gingiva, and posterior pharynx;     NECK: range of motion is normal; thyroid exam reveals not enlarged;     RESPIRATORY: normal respiratory rate and pattern with no distress; inspiratory wheezes in the very faint;     CARDIOVASCULAR: normal rate; rhythm is regular;  no systolic murmur; no edema;     GASTROINTESTINAL: nontender; normal bowel sounds;     MUSCULOSKELETAL: normal gait; normal overall tone     NEUROLOGIC: mental status: alert and oriented x 3;     PSYCHIATRIC:  appropriate affect and demeanor; normal speech pattern; grossly normal memory;         ASSESSMENT           461.0   J01.00  Acute sinusitis, maxillary              DDx:     250.00   E11.8  Type 2 diabetes              DDx:         ORDERS:         Meds Prescribed:       Mucinex (Guaifenesin) 600mg Tablets, Extended Release 1 bid prn  #60 (Sixty) tablet(s) Refills: 0       Flonase Allergy Relief (Fluticasone Propionate) 50mcg/1actuation Nasal Spray 1 spray eac nostril once daily x 2 weeks then once daily prn allergy symptoms  #16 (Sixteen) ml Refills: 0         Lab Orders:       [id number]  BDCB2 - [facility] CBC w/o diff  (Send-Out)         42057  COMP - [facility] Comp. Metabolic Panel  (Send-Out)         92328  A1CEG - [facility] Hemoglobin A1C  (Send-Out)                   PLAN:          Acute sinusitis, maxillary Sx c/w allergic sinusitis with mucous congestion. Mucinex ordered as well as flonase for Sx relief.            Prescriptions:       Mucinex (Guaifenesin) 600mg Tablets, Extended Release 1 bid prn  #60 (Sixty) tablet(s) Refills: 0       Flonase Allergy Relief (Fluticasone Propionate) 50mcg/1actuation Nasal Spray 1 spray eac nostril once daily x 2 weeks then once daily prn allergy symptoms  #16 (Sixteen) ml Refills: 0           Patient Education Handouts:       Sinus Infection (Sinusitis)           Type 2 diabetes     LABORATORY:  Labs ordered to be performed today include CBC W/O DIFF, Comprehensive metabolic panel, and HgbA1C.            Orders:       [id number]  BDCB2 - [facility] CBC w/o diff  (Send-Out)         65738  COMP - [facility] Comp. Metabolic Panel  (Send-Out)         20152  A1CEG - [facility] Hemoglobin A1C  (Send-Out)               CHARGE CAPTURE           **Please note: ICD descriptions below are intended for billing purposes only and may not represent clinical diagnoses**        Primary Diagnosis:         461.0 Acute sinusitis, maxillary            J01.00    Acute maxillary sinusitis, unspecified              Orders:          [id number]   Office/outpatient visit; established patient, level 4  (In-House)           250.00 Type 2 diabetes            E11.8    Type 2 diabetes mellitus with unspecified complications

## 2021-05-18 NOTE — PROGRESS NOTES
KayleeMoe IRIS  1970     Office/Outpatient Visit    Visit Date: Tue, Jan 5, 2021 02:15 pm    Provider: Dang Burns N.P. (Assistant: Didi Zavala, )    Location: [facility]        Electronically signed by Dang Burns N.P. on  01/05/2021 02:41:09 PM                             Subjective:        CC: Balta is a 50 year old White male.  nausea, vomiting, diarrhea x 2 days, no appetite, may have eaten some rotten tomatoes, took some promethazine liquid yesterday and seemed to help         HPI: Patient reports yesterday he woke up with nausea and diarrhea. He vomited once yesterday. Improvement today but continues with some diarrhea and nausea. Denies fever, abdominal pain, bloody stools or vomit. Covid + back in October.     ROS:     CONSTITUTIONAL:  Negative for fatigue and fever.      EYES:  Negative for blurred vision.      E/N/T:  Negative for nasal congestion, frequent rhinorrhea and sinus pressure.      CARDIOVASCULAR:  Negative for chest pain and palpitations.      RESPIRATORY:  Negative for recent cough and dyspnea.      GASTROINTESTINAL:  Positive for diarrhea, nausea and vomiting.   Negative for abdominal pain.      MUSCULOSKELETAL:  Negative for arthralgias and myalgias.      NEUROLOGICAL:  Negative for dizziness, headaches and weakness.      PSYCHIATRIC:  Negative for anxiety and depression.          Past Medical History / Family History / Social History:         Last Reviewed on 1/05/2021 02:31 PM by Dang Burns    Past Medical History:             PAST MEDICAL HISTORY     covid 19  oct 2020     Positive for    Sleep Apnea: (+) sleep study; ;     Positive for    Enlarged aorta;         CURRENT MEDICAL PROVIDERS:    Cardiologist: thomas cartwright         PREVENTIVE HEALTH MAINTENANCE             EYE EXAM: was last done 2019 dr mahan- record requested.          Surgical History:         RTC repair 2006;    L foot fracture repair 8-7-99;         Family History:     Father:  Hypertension     Mother: Type 2 Diabetes     Daughter(s): Healthy; 1 daughter(s) total     Paternal Grandfather: Healthy;      Paternal Grandmother: Healthy;      Maternal Grandfather: Healthy;      Maternal Grandmother: Healthy;          Social History:     Occupation: [employer];     Marital Status:      Children: 1 child     Hobbies/Recreation: he enjoys walking and weight lifting;     Exercise: Primary form of exercise is weight lifting.   Frequency is 4-6 days per week.          Tobacco/Alcohol/Supplements:     Last Reviewed on 2021 02:31 PM by Dang Burns    Tobacco: He has a past history of cigarette smoking; quit date:  .          Substance Abuse History:     Last Reviewed on 2019 02:57 PM by Matilda Fowler        Mental Health History:     Last Reviewed on 2019 02:57 PM by Matilda Fowler        Communicable Diseases (eg STDs):     Last Reviewed on 2019 02:57 PM by Matilda Fowler        Immunizations:     Boostrix (Tdap) 2018        Allergies:     Last Reviewed on 2021 02:31 PM by Dang Burns    No Known Allergies.        Current Medications:     Last Reviewed on 2021 02:31 PM by Dang Burns    metFORMIN 1,000 mg oral tablet [1 tab bid]    glipiZIDE 5 mg oral tablet [1 tablet 1 time daily]    Lisinopril 40mg Tablet [1 tab daily]    Amlodipine  5 mg oral tablet [1 tab daily]    Blood Glucose Test strips  [ Use Lotus Contour Next strips to test once daily  DX E11.9]    multi vit with iron daily     Proventil HFA 90mcg/1actuation Oral Inhaler [Inhale 2 puff(s) by mouth q 4 to 6 hr]    simvastatin 20 mg oral tablet [TAKE 1 TABLET BY MOUTH EVERY DAY]    Januvia 100 mg oral tablet [take 1 tablet (100 mg) by oral route once daily]    Accu-Chek Pat strips  [Test blood sugar once daily DX E11.9]    blood sugar diagnostic kit  [Use Accu Check Meter DX E11.9]        Objective:        Vitals:         Current:  1/5/2021 2:20:32 PM    Ht:  5 ft, 11 in;  Wt: 292.6 lbs;  BMI: 40.8T: 97 F (temporal);  BP: 119/73 mm Hg (left arm, sitting);  P: 87 bpm (left arm (BP Cuff), sitting);  sCr: 0.69 mg/dL;  GFR: 157.00O2 Sat: 98 % (room air)        Exams:     PHYSICAL EXAM:     GENERAL: Vitals recorded well developed, well nourished;  no apparent distress;     EYES: extraocular movements intact; conjunctiva and cornea are normal; PERRLA;     E/N/T: EARS:  normal external auditory canals and tympanic membranes;  grossly normal hearing; NOSE:  normal nasal mucosa, septum, turbinates, and sinuses; OROPHARYNX:  normal mucosa, dentition, gingiva, and posterior pharynx;     RESPIRATORY: normal appearance and symmetric expansion of chest wall; normal respiratory rate and pattern with no distress; normal breath sounds with no rales, rhonchi, wheezes or rubs;     CARDIOVASCULAR: normal rate; rhythm is regular;  no systolic murmur; no edema;     GASTROINTESTINAL: nontender; normal bowel sounds;     LYMPHATIC: no enlargement of cervical or facial nodes;     MUSCULOSKELETAL: normal gait;     NEUROLOGIC: mental status: alert and oriented x 3;     PSYCHIATRIC:  appropriate affect and demeanor; normal speech pattern; grossly normal memory;         Assessment:         K52.9   Noninfective gastroenteritis and colitis, unspecified       R11.2   Nausea with vomiting, unspecified       R19.7   Diarrhea, unspecified           ORDERS:         Meds Prescribed:       [New Rx] promethazine 6.25 mg/5 mL oral Syrup [take 10 milliliters (12.5 mg) by oral route 3 times per day as needed for nausea], #118 (one hundred and eighteen) milliliters, Refills: 0 (zero)                 Plan:         Noninfective gastroenteritis and colitis, unspecified        RECOMMENDATIONS given include: Patient educated that this is most likely viral gastroenteritis. Patient was encouraged to increase fluids and maintain hydration status. Patient to call or go to the ER with worsening  abdominal pain, fever, nausea, vomiting, bloody stools or vomiting..            Prescriptions:       [New Rx] promethazine 6.25 mg/5 mL oral Syrup [take 10 milliliters (12.5 mg) by oral route 3 times per day as needed for nausea], #118 (one hundred and eighteen) milliliters, Refills: 0 (zero)         Nausea with vomiting, unspecifiedsee above        Diarrhea, unspecifiedsee above            Charge Capture:         Primary Diagnosis:     K52.9  Noninfective gastroenteritis and colitis, unspecified           Orders:      [id number]  Office/outpatient visit; established patient, level 3  (In-House)              R11.2  Nausea with vomiting, unspecified     R19.7  Diarrhea, unspecified

## 2021-05-18 NOTE — PROGRESS NOTES
Moe Page  1970     Office/Outpatient Visit    Visit Date: Tue, Dec 10, 2019 04:04 pm    Provider: Rochelle Louis N.P. (Assistant: Beatrice Virk, )    Location: [facility]        Electronically signed by Rochelle Louis N.P. on  12/11/2019 02:40:43 PM                             Subjective:        CC: Balta is a 49 year old White male.  This is a follow-up visit.  patient of [facility], med refills         HPI:           Patient presents with type 2 diabetes mellitus with unspecified complications.  Compliance with treatment has been fair; he forgets lunch time glipzide some days.  He denies experiencing any diabetes related symptoms.  Current meds include metformin and glipizide.  He does not perform home blood glucose monitoring.  In regard to preventative care, his last ophthalmology exam was in 2019 dr mahan.        managed by [facility]          In regard to the mixed hyperlipidemia, current treatment includes Zocor.  Compliance with treatment has been good; he takes his medication as directed.  He denies experiencing any hypercholesterolemia related symptoms.      ROS:     CONSTITUTIONAL:  Negative for chills, fatigue, fever, and weight change.      EYES:  Negative for blurred vision, eye pain, and photophobia.      CARDIOVASCULAR:  Negative for chest pain, palpitations, tachycardia, orthopnea, and edema.      RESPIRATORY:  Negative for cough, dyspnea, and hemoptysis.      GASTROINTESTINAL:  Negative for abdominal pain, heartburn, constipation, diarrhea, and stool changes.      MUSCULOSKELETAL:  Negative for arthralgias, back pain, and myalgias.      NEUROLOGICAL:  Negative for dizziness, headaches, paresthesias, and weakness.      ENDOCRINE:  Negative for hair loss, heat/cold intolerance, polydipsia, and polyphagia.      PSYCHIATRIC:  Negative for anxiety, depression, and sleep disturbances.          Past Medical History / Family History / Social History:          Last Reviewed on 12/10/2019 04:29 PM by Rochelle Louis    Past Medical History:             PAST MEDICAL HISTORY         Positive for    Enlarged aorta;         CURRENT MEDICAL PROVIDERS:    Cardiologist: [facility]         PREVENTIVE HEALTH MAINTENANCE             EYE EXAM: was last done 2019 dr mahan- record requested.          Surgical History:         RTC repair ;    L foot fracture repair 99;         Family History:     Father: Hypertension     Mother: Type 2 Diabetes     Daughter(s): Healthy; 1 daughter(s) total     Paternal Grandfather: Healthy;      Paternal Grandmother: Healthy;      Maternal Grandfather: Healthy;      Maternal Grandmother: Healthy;          Social History:     Occupation: [employer];     Marital Status:      Children: 1 child     Hobbies/Recreation: he enjoys walking and weight lifting;     Exercise: Primary form of exercise is weight lifting.   Frequency is 4-6 days per week.          Tobacco/Alcohol/Supplements:     Last Reviewed on 12/10/2019 04:07 PM by Beatrice Virk    Tobacco: He has a past history of cigarette smoking; quit date:  .          Substance Abuse History:     Last Reviewed on 2019 02:57 PM by Matilda Fowler        Mental Health History:     Last Reviewed on 2019 02:57 PM by Matilda Fowler        Communicable Diseases (eg STDs):     Last Reviewed on 2019 02:57 PM by Matilda Fowler        Current Problems:     Last Reviewed on 12/10/2019 04:29 PM by Rochelle Louis    Obstructive sleep apnea (adult) (pediatric)    Essential (primary) hypertension    Type 2 diabetes mellitus with unspecified complications    Vitamin D deficiency, unspecified        Immunizations:     Boostrix (Tdap) 2018        Allergies:     Last Reviewed on 2019 02:57 PM by Matilda Fowler    No Known Allergies.        Current Medications:     Last Reviewed on 12/10/2019 04:07 PM by Sully  Beatrice CAMPBELL    Amlodipine  5mg Tablet [1 tab daily]    Lisinopril 40mg Tablet [1 tab daily]    Glipizide 5mg Tablets [1 tablet tid]    Metformin HCl 1,000mg Tablet [1 tab bid]    multi vit with iron daily    Proventil HFA 90mcg/1actuation Oral Inhaler [Inhale 2 puff(s) by mouth q 4 to 6 hr]    Simvastatin 20mg Tablet [1 tab daily]        Objective:        Vitals:         Historical:     7/30/2019  BP:   129/72 mm Hg ( (right arm, , sitting, );) 7/30/2019  Wt:   303.4lbs3/21/2019  Wt:   308.6lbs    Current: 12/10/2019 4:09:52 PM    Ht:  5 ft, 11 in;  Wt: 315 lbs;  BMI: 43.9T: 97.8 F (oral);  BP: 140/93 mm Hg (right arm, sitting);  P: 96 bpm (right arm (BP Cuff), sitting);  sCr: 0.74 mg/dL;  GFR: 152.66        Repeat:     4:41:9 PM  BP:   132/84mm Hg (right arm, sitting)     Exams:     PHYSICAL EXAM:     GENERAL: no apparent distress;     RESPIRATORY: normal respiratory rate and pattern with no distress; normal breath sounds with no rales, rhonchi, wheezes or rubs;     CARDIOVASCULAR: normal rate; rhythm is regular;  no edema;     MUSCULOSKELETAL:  Normal range of motion, strength and tone;     NEUROLOGIC: mental status: alert and oriented x 3; GROSSLY INTACT     PSYCHIATRIC:  appropriate affect and demeanor; normal speech pattern; grossly normal memory;         Assessment:         E11.8   Type 2 diabetes mellitus with unspecified complications       I10   Essential (primary) hypertension       E78.2   Mixed hyperlipidemia           ORDERS:         Meds Prescribed:       [Refilled] metFORMIN 1,000 mg oral tablet [1 tab bid], #180 (one hundred and eighty) tablets, Refills: 1 (one)       [Refilled] Glipizide 5 mg oral tablet [1 tablet tid], #270 (two hundred and seventy) tablets, Refills: 1 (one)       [Refilled] Simvastatin 20 mg oral tablet [1 tab daily], #90 (ninety) tablets, Refills: 1 (one)         Lab Orders:       [id number]  DIAB - [facility] LIPID,CMP, A1C: 91237, 51723, 44641  (Send-Out)              Other Orders:        DLEYE  Dilated Eye Exam  (Send-Out)                      Plan:         Type 2 diabetes mellitus with unspecified complications    LABORATORY:  Labs ordered to be performed today include Diabetes Panel 1; CMP, Lipid, A1C.      RECOMMENDATIONS: adherance to Low carb, NCS diet diet, annual monofilament test for evaluating sensation in feet, daily foot self-inspection, lower blood pressure, yearly dental exams, annual eye exams, and need for yearly flu shots.      COUNSELING: The patient was counseled concerning the relationship between diabetes control and macrovascular disease including cardiovascular, cerebrovascular and peripheral vascular disease. The patient was counseled concerning the relationship between diabetes control and retinopathy, nephropathy, and neuropathy. The A1c target of <7% according to ADA and <6.5% according to AACE were discussed.            Prescriptions:       [Refilled] metFORMIN 1,000 mg oral tablet [1 tab bid], #180 (one hundred and eighty) tablets, Refills: 1 (one)       [Refilled] Glipizide 5 mg oral tablet [1 tablet tid], #270 (two hundred and seventy) tablets, Refills: 1 (one)           Orders:       [id number]  DIAB[address] [place] [address] LIPID,CMP, A1C: 49188, 09785, 52796  (Send-Out)            DLEYE  Dilated Eye Exam  (Send-Out)              Essential (primary) hypertensioncontinue with cardiology    MIPS Vaccines Flu and Pneumonia updated in Shot record         Mixed hyperlipidemia        RECOMMENDATIONS given include: exercise, low cholesterol/low fat diet, and weight loss.            Prescriptions:       [Refilled] Simvastatin 20 mg oral tablet [1 tab daily], #90 (ninety) tablets, Refills: 1 (one)             Patient Recommendations:        For  Type 2 diabetes mellitus with unspecified complications:    Follow a diabetic diet as directed. Have an annual monofilament test to check for diabetes-related sensory nerve disturbance in the feet. Examine your feet daily.  Small cuts and injuries often go  unnoticed and can lead to serious infections. Have an annual dental exam. Have an annual eye exam. Obtain a flu shot each year.          For  Mixed hyperlipidemia:    Maintain a regular exercise program. Reduce the amount of cholesterol and saturated fat in your diet. Try to lose some weight; even modest weight reduction can improve your blood pressure.              Charge Capture:         Primary Diagnosis:     E11.8  Type 2 diabetes mellitus with unspecified complications           Orders:      [id number]  Office/outpatient visit; established patient, level 4  (In-House)              I10  Essential (primary) hypertension     E78.2  Mixed hyperlipidemia

## 2021-06-22 ENCOUNTER — TELEPHONE (OUTPATIENT)
Dept: FAMILY MEDICINE CLINIC | Age: 51
End: 2021-06-22

## 2021-06-22 NOTE — TELEPHONE ENCOUNTER
[facility] called re: the Amlodipine, you filled it 11/20/21 for #90 w/1 refill.  Do you want him to get this from cardiology now?

## 2021-06-22 NOTE — TELEPHONE ENCOUNTER
I did not see reference to refilling amlodipine in his past visit notes and he does see cardiology.  Keep in mind we have new electronic medical record system and previous refill may have been prompted by a patient call that would have been in a log phone note in the old system.  What does the patient say?  How often does he see cardiology?  He is due appt in august.

## 2021-07-01 VITALS
TEMPERATURE: 99.7 F | BODY MASS INDEX: 42.5 KG/M2 | HEIGHT: 71 IN | SYSTOLIC BLOOD PRESSURE: 118 MMHG | HEART RATE: 81 BPM | DIASTOLIC BLOOD PRESSURE: 71 MMHG | WEIGHT: 303.6 LBS

## 2021-07-01 VITALS
TEMPERATURE: 97.8 F | HEART RATE: 96 BPM | BODY MASS INDEX: 44.1 KG/M2 | DIASTOLIC BLOOD PRESSURE: 84 MMHG | SYSTOLIC BLOOD PRESSURE: 132 MMHG | HEIGHT: 71 IN | WEIGHT: 315 LBS

## 2021-07-01 VITALS
DIASTOLIC BLOOD PRESSURE: 91 MMHG | BODY MASS INDEX: 43.68 KG/M2 | SYSTOLIC BLOOD PRESSURE: 132 MMHG | TEMPERATURE: 98 F | WEIGHT: 312 LBS | HEART RATE: 101 BPM | HEIGHT: 71 IN

## 2021-07-01 VITALS
TEMPERATURE: 98.8 F | WEIGHT: 313.6 LBS | BODY MASS INDEX: 43.9 KG/M2 | HEIGHT: 71 IN | HEART RATE: 100 BPM | SYSTOLIC BLOOD PRESSURE: 137 MMHG | DIASTOLIC BLOOD PRESSURE: 79 MMHG

## 2021-07-01 VITALS
DIASTOLIC BLOOD PRESSURE: 75 MMHG | SYSTOLIC BLOOD PRESSURE: 125 MMHG | HEIGHT: 71 IN | HEART RATE: 87 BPM | TEMPERATURE: 98 F | WEIGHT: 308.6 LBS | BODY MASS INDEX: 43.2 KG/M2

## 2021-07-01 VITALS
BODY MASS INDEX: 42.48 KG/M2 | DIASTOLIC BLOOD PRESSURE: 72 MMHG | HEART RATE: 89 BPM | SYSTOLIC BLOOD PRESSURE: 129 MMHG | TEMPERATURE: 98.7 F | HEIGHT: 71 IN | WEIGHT: 303.4 LBS

## 2021-07-01 VITALS
DIASTOLIC BLOOD PRESSURE: 83 MMHG | HEIGHT: 71 IN | WEIGHT: 309.3 LBS | SYSTOLIC BLOOD PRESSURE: 137 MMHG | TEMPERATURE: 97.7 F | HEART RATE: 95 BPM | BODY MASS INDEX: 43.3 KG/M2

## 2021-07-01 VITALS
BODY MASS INDEX: 42.76 KG/M2 | TEMPERATURE: 98.6 F | DIASTOLIC BLOOD PRESSURE: 79 MMHG | HEART RATE: 78 BPM | WEIGHT: 305.4 LBS | HEIGHT: 71 IN | SYSTOLIC BLOOD PRESSURE: 122 MMHG

## 2021-07-02 VITALS
HEIGHT: 71 IN | SYSTOLIC BLOOD PRESSURE: 118 MMHG | HEART RATE: 78 BPM | DIASTOLIC BLOOD PRESSURE: 66 MMHG | TEMPERATURE: 97.5 F | WEIGHT: 284.4 LBS | BODY MASS INDEX: 39.81 KG/M2

## 2021-07-02 VITALS
OXYGEN SATURATION: 96 % | TEMPERATURE: 96.5 F | HEIGHT: 71 IN | WEIGHT: 290.6 LBS | HEART RATE: 93 BPM | BODY MASS INDEX: 40.68 KG/M2 | SYSTOLIC BLOOD PRESSURE: 136 MMHG | DIASTOLIC BLOOD PRESSURE: 88 MMHG

## 2021-07-02 VITALS
DIASTOLIC BLOOD PRESSURE: 94 MMHG | HEIGHT: 71 IN | HEART RATE: 83 BPM | BODY MASS INDEX: 42.11 KG/M2 | SYSTOLIC BLOOD PRESSURE: 142 MMHG | WEIGHT: 300.8 LBS | TEMPERATURE: 96.7 F

## 2021-07-02 VITALS
TEMPERATURE: 97 F | OXYGEN SATURATION: 98 % | WEIGHT: 292.6 LBS | HEART RATE: 87 BPM | SYSTOLIC BLOOD PRESSURE: 119 MMHG | BODY MASS INDEX: 40.96 KG/M2 | HEIGHT: 71 IN | DIASTOLIC BLOOD PRESSURE: 73 MMHG

## 2021-07-13 ENCOUNTER — HOSPITAL ENCOUNTER (EMERGENCY)
Dept: HOSPITAL 49 - FER | Age: 51
Discharge: HOME | End: 2021-07-13
Payer: COMMERCIAL

## 2021-07-13 DIAGNOSIS — Z79.84: ICD-10-CM
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
## 2021-07-13 DIAGNOSIS — S50.02XA: Primary | ICD-10-CM

## 2021-07-13 DIAGNOSIS — I10: ICD-10-CM

## 2021-07-13 DIAGNOSIS — E11.9: ICD-10-CM
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
## 2021-08-10 ENCOUNTER — LAB (OUTPATIENT)
Dept: LAB | Facility: HOSPITAL | Age: 51
End: 2021-08-10

## 2021-08-10 ENCOUNTER — OFFICE VISIT (OUTPATIENT)
Dept: FAMILY MEDICINE CLINIC | Age: 51
End: 2021-08-10

## 2021-08-10 VITALS
TEMPERATURE: 98.3 F | HEIGHT: 71 IN | BODY MASS INDEX: 38.33 KG/M2 | HEART RATE: 72 BPM | WEIGHT: 273.8 LBS | SYSTOLIC BLOOD PRESSURE: 113 MMHG | DIASTOLIC BLOOD PRESSURE: 74 MMHG

## 2021-08-10 DIAGNOSIS — E78.2 MIXED HYPERLIPIDEMIA: ICD-10-CM

## 2021-08-10 DIAGNOSIS — E11.9 TYPE 2 DIABETES MELLITUS WITHOUT COMPLICATION, WITHOUT LONG-TERM CURRENT USE OF INSULIN (HCC): ICD-10-CM

## 2021-08-10 DIAGNOSIS — E11.9 TYPE 2 DIABETES MELLITUS WITHOUT COMPLICATION, WITHOUT LONG-TERM CURRENT USE OF INSULIN (HCC): Primary | ICD-10-CM

## 2021-08-10 DIAGNOSIS — I10 ESSENTIAL HYPERTENSION: ICD-10-CM

## 2021-08-10 PROBLEM — I51.7 LEFT VENTRICULAR HYPERTROPHY: Status: ACTIVE | Noted: 2018-09-25

## 2021-08-10 LAB
ALBUMIN SERPL-MCNC: 4.6 G/DL (ref 3.5–5.2)
ALBUMIN UR-MCNC: 1.3 MG/DL
ALBUMIN/GLOB SERPL: 2.3 G/DL
ALP SERPL-CCNC: 62 U/L (ref 39–117)
ALT SERPL W P-5'-P-CCNC: 22 U/L (ref 1–41)
ANION GAP SERPL CALCULATED.3IONS-SCNC: 10.1 MMOL/L (ref 5–15)
AST SERPL-CCNC: 22 U/L (ref 1–40)
BILIRUB SERPL-MCNC: 0.4 MG/DL (ref 0–1.2)
BUN SERPL-MCNC: 12 MG/DL (ref 6–20)
BUN/CREAT SERPL: 13.6 (ref 7–25)
CALCIUM SPEC-SCNC: 9.3 MG/DL (ref 8.6–10.5)
CHLORIDE SERPL-SCNC: 104 MMOL/L (ref 98–107)
CHOLEST SERPL-MCNC: 146 MG/DL (ref 0–200)
CO2 SERPL-SCNC: 24.9 MMOL/L (ref 22–29)
CREAT SERPL-MCNC: 0.88 MG/DL (ref 0.76–1.27)
CREAT UR-MCNC: 152.2 MG/DL
GFR SERPL CREATININE-BSD FRML MDRD: 92 ML/MIN/1.73
GLOBULIN UR ELPH-MCNC: 2 GM/DL
GLUCOSE SERPL-MCNC: 119 MG/DL (ref 65–99)
HBA1C MFR BLD: 6.2 % (ref 4.8–5.6)
HDLC SERPL-MCNC: 47 MG/DL (ref 40–60)
LDLC SERPL CALC-MCNC: 87 MG/DL (ref 0–100)
LDLC/HDLC SERPL: 1.85 {RATIO}
MICROALBUMIN/CREAT UR: 8.5 MG/G
POTASSIUM SERPL-SCNC: 4.6 MMOL/L (ref 3.5–5.2)
PROT SERPL-MCNC: 6.6 G/DL (ref 6–8.5)
SODIUM SERPL-SCNC: 139 MMOL/L (ref 136–145)
TRIGL SERPL-MCNC: 60 MG/DL (ref 0–150)
VLDLC SERPL-MCNC: 12 MG/DL (ref 5–40)

## 2021-08-10 PROCEDURE — 82570 ASSAY OF URINE CREATININE: CPT

## 2021-08-10 PROCEDURE — 36415 COLL VENOUS BLD VENIPUNCTURE: CPT

## 2021-08-10 PROCEDURE — 83036 HEMOGLOBIN GLYCOSYLATED A1C: CPT

## 2021-08-10 PROCEDURE — 80061 LIPID PANEL: CPT

## 2021-08-10 PROCEDURE — 82043 UR ALBUMIN QUANTITATIVE: CPT

## 2021-08-10 PROCEDURE — 99213 OFFICE O/P EST LOW 20 MIN: CPT | Performed by: NURSE PRACTITIONER

## 2021-08-10 PROCEDURE — 80053 COMPREHEN METABOLIC PANEL: CPT

## 2021-08-10 RX ORDER — GLIPIZIDE 5 MG/1
5 TABLET ORAL DAILY
Qty: 90 TABLET | Refills: 1 | Status: SHIPPED | OUTPATIENT
Start: 2021-08-10 | End: 2022-03-14

## 2021-08-10 RX ORDER — SIMVASTATIN 20 MG
20 TABLET ORAL NIGHTLY
Qty: 90 TABLET | Refills: 1 | Status: SHIPPED | OUTPATIENT
Start: 2021-08-10 | End: 2021-09-14

## 2021-08-10 NOTE — PROGRESS NOTES
"Chief Complaint  Diabetes and Follow-up    Subjective          Moe Page presents to [facility]    Review of Systems   Constitutional: Negative.    Respiratory: Negative.    Cardiovascular: Negative.    Musculoskeletal: Negative.    Neurological: Negative.    Psychiatric/Behavioral: Negative.    Moe is a 50-year-old male here today for follow-up on type 2 diabetes.  He takes Metformin 1000 mg twice daily, Januvia 100 mg once daily, and glipizide 5 mg once daily.  He states fasting blood sugars average around 120 and he denies any episodes of low blood sugar readings.  His last hemoglobin A1c was 7.1% in February.  His last eye exam was in 2020 at [facility].  He denies any burning or numbness or tingling of his feet.  Energy level is reported as good.  Blood pressure is controlled and is managed by cardiology.  He takes simvastatin 20 mg at night for hyperlipidemia.  His family history is negative for colon cancer or polyps.     Objective   Vital Signs:   Vitals:    08/10/21 0807   BP: 113/74   Pulse: 72   Temp: 98.3 °F (36.8 °C)   TempSrc: Oral   Weight: 124 kg (273 lb 12.8 oz)   Height: 180.3 cm (70.98\")      Physical Exam  Vitals reviewed.   Constitutional:       General: He is not in acute distress.     Appearance: Normal appearance. He is well-developed.   Neck:      Thyroid: No thyroid mass, thyromegaly or thyroid tenderness.   Cardiovascular:      Rate and Rhythm: Normal rate and regular rhythm.      Pulses:           Dorsalis pedis pulses are 2+ on the right side and 2+ on the left side.        Posterior tibial pulses are 2+ on the right side and 2+ on the left side.      Heart sounds: Normal heart sounds.   Pulmonary:      Effort: Pulmonary effort is normal.      Breath sounds: Normal breath sounds.   Musculoskeletal:      Right lower leg: No edema.      Left lower leg: No edema.   Feet:      Right foot:      Protective Sensation: 3 sites tested. 3 sites " sensed.      Skin integrity: Skin integrity normal. No ulcer or blister.      Toenail Condition: Right toenails are normal.      Left foot:      Protective Sensation: 3 sites tested. 3 sites sensed.      Skin integrity: Skin integrity normal. No ulcer or blister.      Toenail Condition: Left toenails are normal.      Comments:      Skin:     General: Skin is warm and dry.   Neurological:      General: No focal deficit present.      Mental Status: He is alert.   Psychiatric:         Attention and Perception: Attention normal.         Mood and Affect: Mood and affect normal.         Behavior: Behavior normal.          Result Review :                Assessment and Plan    Diagnoses and all orders for this visit:    1. Type 2 diabetes mellitus without complication, without long-term current use of insulin (CMS/[facility]) (Primary)  Assessment & Plan:  Lab work will be obtained today.  Advise annual eye exam and monitor for symptoms of neuropathy.  He may check coverage with insurance on colon cancer screening is some insurances now cover those testing procedures at age 45.    Orders:  -     Comprehensive metabolic panel; Future  -     Hemoglobin A1c; Future  -     Lipid panel; Future  -     Microalbumin / Creatinine Urine Ratio - Urine, Clean Catch; Future  -     glipizide (GLUCOTROL) 5 MG tablet; Take 1 tablet by mouth Daily.  Dispense: 90 tablet; Refill: 1  -     metFORMIN (GLUCOPHAGE) 1000 MG tablet; Take 1 tablet by mouth 2 (Two) Times a Day With Meals.  Dispense: 180 tablet; Refill: 1  -     SITagliptin (JANUVIA) 100 MG tablet; Take 1 tablet by mouth Daily.  Dispense: 90 tablet; Refill: 1    2. Mixed hyperlipidemia  Assessment & Plan:  Simvastatin is refilled.  Follow a low-cholesterol diet and exercise.  He is having success with some intentional weight loss with healthier diet    Orders:  -     Comprehensive metabolic panel; Future  -     Lipid panel; Future  -     simvastatin (ZOCOR) 20 MG tablet; Take 1 tablet by  mouth Every Night.  Dispense: 90 tablet; Refill: 1    3. Essential hypertension  Assessment & Plan:  Continue per cardiology        Follow Up    Return in about 6 months (around 2/10/2022).  Patient was given instructions and counseling regarding his condition or for health maintenance advice. Please see specific information pulled into the AVS if appropriate.

## 2021-08-10 NOTE — ASSESSMENT & PLAN NOTE
Lab work will be obtained today.  Advise annual eye exam and monitor for symptoms of neuropathy.  He may check coverage with insurance on colon cancer screening is some insurances now cover those testing procedures at age 45.

## 2021-08-10 NOTE — ASSESSMENT & PLAN NOTE
Simvastatin is refilled.  Follow a low-cholesterol diet and exercise.  He is having success with some intentional weight loss with healthier diet

## 2021-08-11 NOTE — PROGRESS NOTES
Diabetes is under good control.  Kidney and liver function is normal.  Lipid panel is normal.  Follow up in 6 months or sooner if concerns.

## 2021-09-13 DIAGNOSIS — E11.9 TYPE 2 DIABETES MELLITUS WITHOUT COMPLICATION, WITHOUT LONG-TERM CURRENT USE OF INSULIN (HCC): ICD-10-CM

## 2021-09-13 DIAGNOSIS — E78.2 MIXED HYPERLIPIDEMIA: ICD-10-CM

## 2021-09-14 RX ORDER — SIMVASTATIN 20 MG
TABLET ORAL
Qty: 90 TABLET | Refills: 1 | Status: SHIPPED | OUTPATIENT
Start: 2021-09-14 | End: 2022-03-08 | Stop reason: SDUPTHER

## 2022-02-22 DIAGNOSIS — E11.9 TYPE 2 DIABETES MELLITUS WITHOUT COMPLICATION, WITHOUT LONG-TERM CURRENT USE OF INSULIN: ICD-10-CM

## 2022-02-23 RX ORDER — SITAGLIPTIN 100 MG/1
TABLET, FILM COATED ORAL
Qty: 30 TABLET | Refills: 0 | Status: SHIPPED | OUTPATIENT
Start: 2022-02-23 | End: 2022-03-08 | Stop reason: SDUPTHER

## 2022-03-08 ENCOUNTER — LAB (OUTPATIENT)
Dept: LAB | Facility: HOSPITAL | Age: 52
End: 2022-03-08

## 2022-03-08 ENCOUNTER — OFFICE VISIT (OUTPATIENT)
Dept: FAMILY MEDICINE CLINIC | Age: 52
End: 2022-03-08

## 2022-03-08 VITALS
HEIGHT: 71 IN | HEART RATE: 72 BPM | OXYGEN SATURATION: 97 % | DIASTOLIC BLOOD PRESSURE: 76 MMHG | WEIGHT: 266.8 LBS | SYSTOLIC BLOOD PRESSURE: 124 MMHG | BODY MASS INDEX: 37.35 KG/M2

## 2022-03-08 DIAGNOSIS — E11.9 TYPE 2 DIABETES MELLITUS WITHOUT COMPLICATION, WITHOUT LONG-TERM CURRENT USE OF INSULIN: ICD-10-CM

## 2022-03-08 DIAGNOSIS — E11.9 TYPE 2 DIABETES MELLITUS WITHOUT COMPLICATION, WITHOUT LONG-TERM CURRENT USE OF INSULIN: Primary | ICD-10-CM

## 2022-03-08 DIAGNOSIS — E78.2 MIXED HYPERLIPIDEMIA: ICD-10-CM

## 2022-03-08 DIAGNOSIS — I10 ESSENTIAL HYPERTENSION: ICD-10-CM

## 2022-03-08 LAB
ALBUMIN SERPL-MCNC: 4.7 G/DL (ref 3.5–5.2)
ALBUMIN/GLOB SERPL: 2.1 G/DL
ALP SERPL-CCNC: 62 U/L (ref 39–117)
ALT SERPL W P-5'-P-CCNC: 23 U/L (ref 1–41)
ANION GAP SERPL CALCULATED.3IONS-SCNC: 10.2 MMOL/L (ref 5–15)
AST SERPL-CCNC: 19 U/L (ref 1–40)
BILIRUB SERPL-MCNC: 0.5 MG/DL (ref 0–1.2)
BUN SERPL-MCNC: 9 MG/DL (ref 6–20)
BUN/CREAT SERPL: 12.2 (ref 7–25)
CALCIUM SPEC-SCNC: 9.7 MG/DL (ref 8.6–10.5)
CHLORIDE SERPL-SCNC: 103 MMOL/L (ref 98–107)
CHOLEST SERPL-MCNC: 139 MG/DL (ref 0–200)
CO2 SERPL-SCNC: 26.8 MMOL/L (ref 22–29)
CREAT SERPL-MCNC: 0.74 MG/DL (ref 0.76–1.27)
EGFRCR SERPLBLD CKD-EPI 2021: 111.1 ML/MIN/1.73
GLOBULIN UR ELPH-MCNC: 2.2 GM/DL
GLUCOSE SERPL-MCNC: 135 MG/DL (ref 65–99)
HBA1C MFR BLD: 6.8 % (ref 4.8–5.6)
HDLC SERPL-MCNC: 52 MG/DL (ref 40–60)
LDLC SERPL CALC-MCNC: 76 MG/DL (ref 0–100)
LDLC/HDLC SERPL: 1.48 {RATIO}
POTASSIUM SERPL-SCNC: 4.5 MMOL/L (ref 3.5–5.2)
PROT SERPL-MCNC: 6.9 G/DL (ref 6–8.5)
SODIUM SERPL-SCNC: 140 MMOL/L (ref 136–145)
TRIGL SERPL-MCNC: 49 MG/DL (ref 0–150)
VLDLC SERPL-MCNC: 11 MG/DL (ref 5–40)

## 2022-03-08 PROCEDURE — 80053 COMPREHEN METABOLIC PANEL: CPT

## 2022-03-08 PROCEDURE — 99213 OFFICE O/P EST LOW 20 MIN: CPT | Performed by: NURSE PRACTITIONER

## 2022-03-08 PROCEDURE — 83036 HEMOGLOBIN GLYCOSYLATED A1C: CPT

## 2022-03-08 PROCEDURE — 80061 LIPID PANEL: CPT

## 2022-03-08 PROCEDURE — 36415 COLL VENOUS BLD VENIPUNCTURE: CPT

## 2022-03-08 RX ORDER — SIMVASTATIN 20 MG
20 TABLET ORAL DAILY
Qty: 90 TABLET | Refills: 1 | Status: SHIPPED | OUTPATIENT
Start: 2022-03-08 | End: 2022-12-13

## 2022-03-08 NOTE — ASSESSMENT & PLAN NOTE
As long as hemoglobin A1c is less than 7%, will anticipate he will stop glipizide and remain on Metformin and Januvia with a repeat of his labs in 3 months.  Further direction will be provided after reviewing his labs.

## 2022-03-09 DIAGNOSIS — E11.9 TYPE 2 DIABETES MELLITUS WITHOUT COMPLICATION, WITHOUT LONG-TERM CURRENT USE OF INSULIN: Primary | ICD-10-CM

## 2022-03-10 NOTE — PROGRESS NOTES
Hemoglobin a1c has increased slightly but diabetes is still under good control.  You report low blood sugar episodes and would like to stop glipizide but continue metformin and januvia..  ok to stop glipizide.  Continue metformin and januvia.  Recheck labs in 3 months.  I have entered lab orders in the chart.    Lipid panel is normal.

## 2022-03-11 DIAGNOSIS — E11.9 TYPE 2 DIABETES MELLITUS WITHOUT COMPLICATION, WITHOUT LONG-TERM CURRENT USE OF INSULIN: ICD-10-CM

## 2022-03-14 DIAGNOSIS — E11.9 TYPE 2 DIABETES MELLITUS WITHOUT COMPLICATION, WITHOUT LONG-TERM CURRENT USE OF INSULIN: ICD-10-CM

## 2022-03-14 RX ORDER — SITAGLIPTIN 100 MG/1
TABLET, FILM COATED ORAL
Qty: 30 TABLET | Refills: 0 | OUTPATIENT
Start: 2022-03-14

## 2022-03-14 RX ORDER — GLIPIZIDE 5 MG/1
TABLET ORAL
Qty: 90 TABLET | Refills: 0 | Status: SHIPPED | OUTPATIENT
Start: 2022-03-14 | End: 2022-09-27

## 2022-04-27 ENCOUNTER — PATIENT MESSAGE (OUTPATIENT)
Dept: FAMILY MEDICINE CLINIC | Age: 52
End: 2022-04-27

## 2022-05-05 ENCOUNTER — TELEPHONE (OUTPATIENT)
Dept: FAMILY MEDICINE CLINIC | Age: 52
End: 2022-05-05

## 2022-05-05 NOTE — TELEPHONE ENCOUNTER
Left message on voicemail to come to allergy room to  physical form and get his waist circumference done.

## 2022-06-10 DIAGNOSIS — E11.9 TYPE 2 DIABETES MELLITUS WITHOUT COMPLICATION, WITHOUT LONG-TERM CURRENT USE OF INSULIN: ICD-10-CM

## 2022-06-10 NOTE — TELEPHONE ENCOUNTER
Left message for pt to return call, according to last labs, he was to stop glipizide and recheck labs in 3 months/orders are in chart

## 2022-06-13 RX ORDER — GLIPIZIDE 5 MG/1
TABLET ORAL
Qty: 90 TABLET | Refills: 0 | OUTPATIENT
Start: 2022-06-13

## 2022-06-13 NOTE — TELEPHONE ENCOUNTER
Pt states that he has not been taking this med, I called [facility] and left a message on their vm to take this med off pts profile.  He will try to stop by the lab one day this week to get blood work

## 2022-06-22 ENCOUNTER — LAB (OUTPATIENT)
Dept: LAB | Facility: HOSPITAL | Age: 52
End: 2022-06-22

## 2022-06-22 DIAGNOSIS — E11.9 TYPE 2 DIABETES MELLITUS WITHOUT COMPLICATION, WITHOUT LONG-TERM CURRENT USE OF INSULIN: ICD-10-CM

## 2022-06-22 LAB
ALBUMIN SERPL-MCNC: 4.6 G/DL (ref 3.5–5.2)
ALBUMIN/GLOB SERPL: 2.2 G/DL
ALP SERPL-CCNC: 67 U/L (ref 39–117)
ALT SERPL W P-5'-P-CCNC: 19 U/L (ref 1–41)
ANION GAP SERPL CALCULATED.3IONS-SCNC: 17.1 MMOL/L (ref 5–15)
AST SERPL-CCNC: 26 U/L (ref 1–40)
BILIRUB SERPL-MCNC: 0.6 MG/DL (ref 0–1.2)
BUN SERPL-MCNC: 11 MG/DL (ref 6–20)
BUN/CREAT SERPL: 12.8 (ref 7–25)
CALCIUM SPEC-SCNC: 9.7 MG/DL (ref 8.6–10.5)
CHLORIDE SERPL-SCNC: 96 MMOL/L (ref 98–107)
CHOLEST SERPL-MCNC: 121 MG/DL (ref 0–200)
CO2 SERPL-SCNC: 25.9 MMOL/L (ref 22–29)
CREAT SERPL-MCNC: 0.86 MG/DL (ref 0.76–1.27)
EGFRCR SERPLBLD CKD-EPI 2021: 106.1 ML/MIN/1.73
GLOBULIN UR ELPH-MCNC: 2.1 GM/DL
GLUCOSE SERPL-MCNC: 126 MG/DL (ref 65–99)
HBA1C MFR BLD: 6.6 % (ref 4.8–5.6)
HDLC SERPL-MCNC: 51 MG/DL (ref 40–60)
LDLC SERPL CALC-MCNC: 57 MG/DL (ref 0–100)
LDLC/HDLC SERPL: 1.15 {RATIO}
POTASSIUM SERPL-SCNC: 4.5 MMOL/L (ref 3.5–5.2)
PROT SERPL-MCNC: 6.7 G/DL (ref 6–8.5)
SODIUM SERPL-SCNC: 139 MMOL/L (ref 136–145)
TRIGL SERPL-MCNC: 57 MG/DL (ref 0–150)
VLDLC SERPL-MCNC: 13 MG/DL (ref 5–40)

## 2022-06-22 PROCEDURE — 83036 HEMOGLOBIN GLYCOSYLATED A1C: CPT

## 2022-06-22 PROCEDURE — 80061 LIPID PANEL: CPT

## 2022-06-22 PROCEDURE — 36415 COLL VENOUS BLD VENIPUNCTURE: CPT

## 2022-06-22 PROCEDURE — 80053 COMPREHEN METABOLIC PANEL: CPT

## 2022-09-13 DIAGNOSIS — E11.9 TYPE 2 DIABETES MELLITUS WITHOUT COMPLICATION, WITHOUT LONG-TERM CURRENT USE OF INSULIN: ICD-10-CM

## 2022-09-27 ENCOUNTER — OFFICE VISIT (OUTPATIENT)
Dept: FAMILY MEDICINE CLINIC | Age: 52
End: 2022-09-27

## 2022-09-27 VITALS
HEART RATE: 72 BPM | SYSTOLIC BLOOD PRESSURE: 138 MMHG | BODY MASS INDEX: 30.21 KG/M2 | OXYGEN SATURATION: 97 % | WEIGHT: 215.8 LBS | HEIGHT: 71 IN | DIASTOLIC BLOOD PRESSURE: 89 MMHG

## 2022-09-27 DIAGNOSIS — E11.9 TYPE 2 DIABETES MELLITUS WITHOUT COMPLICATION, WITHOUT LONG-TERM CURRENT USE OF INSULIN: Primary | ICD-10-CM

## 2022-09-27 DIAGNOSIS — E78.2 MIXED HYPERLIPIDEMIA: ICD-10-CM

## 2022-09-27 PROCEDURE — 99213 OFFICE O/P EST LOW 20 MIN: CPT | Performed by: NURSE PRACTITIONER

## 2022-09-27 NOTE — ASSESSMENT & PLAN NOTE
No refills needed today.  He is not fasting.  Will return on Thursday morning to obtain fasting lab work.  Further treatment pending lab results.

## 2022-09-27 NOTE — PROGRESS NOTES
"Chief Complaint  Diabetes (6 month ), Hypertension, and Hyperlipidemia    Subjective  Patient is a 52-year-old male who is here today to follow-up regarding type 2 diabetes.  He was having low blood sugar episodes on glipizide so it was discontinued.  He is currently taking metformin 1000 mg twice per day and Januvia 100 mg once per day.  He states he had an injury at work where a garage door came down on his head and he had some neck and back pain after that.  That is being managed by chiropractor, Dr. Perez.  He is improved with chiropractor and massage.  He did forget to take his medicine some days during that time this summer.  He does not need medicine refills today.  He is having luck with intentional weight loss.    For hyperlipidemia he is taking simvastatin 20 mg at bedtime.  He denies side effects and does not need refills at this time.  Cardiology manages his hypertension and prescribes his amlodipine.  He is advised that amlodipine may prevent processing of simvastatin through the liver which could therefore raise likelihood of side effects including myalgia.  He denies any side effects such as myalgia or any other concerns with amlodipine and simvastatin medication.  He does not want to change his cholesterol medicine at this time.        Moe Page presents to [facility]          Objective   Vital Signs:   Vitals:    09/27/22 0959   BP: 138/89   BP Location: Right arm   Patient Position: Sitting   Cuff Size: Large Adult   Pulse: 72   SpO2: 97%   Weight: 97.9 kg (215 lb 12.8 oz)   Height: 180.3 cm (71\")      Body mass index is 30.1 kg/m².  Physical Exam  Vitals reviewed.   Constitutional:       General: He is not in acute distress.     Appearance: Normal appearance. He is well-developed.   Cardiovascular:      Rate and Rhythm: Normal rate and regular rhythm.      Pulses:           Dorsalis pedis pulses are 2+ on the right side and 2+ on the left side.      " Heart sounds: Normal heart sounds.   Pulmonary:      Effort: Pulmonary effort is normal.      Breath sounds: Normal breath sounds.   Musculoskeletal:      Right lower leg: No edema.      Left lower leg: No edema.   Feet:      Right foot:      Protective Sensation: 3 sites tested. 3 sites sensed.      Skin integrity: Skin integrity normal. No ulcer or blister.      Toenail Condition: Right toenails are normal.      Left foot:      Protective Sensation: 3 sites tested. 3 sites sensed.      Skin integrity: Skin integrity normal. No ulcer or blister.      Toenail Condition: Left toenails are normal.      Comments:      Skin:     General: Skin is warm and dry.   Neurological:      General: No focal deficit present.      Mental Status: He is alert.   Psychiatric:         Attention and Perception: Attention normal.         Mood and Affect: Mood and affect normal.         Behavior: Behavior normal.          Result Review :     CMP    CMP 3/8/22 6/22/22   Glucose 135 (A) 126 (A)   BUN 9 11   Creatinine 0.74 (A) 0.86   Sodium 140 139   Potassium 4.5 4.5   Chloride 103 96 (A)   Calcium 9.7 9.7   Albumin 4.70 4.60   Total Bilirubin 0.5 0.6   Alkaline Phosphatase 62 67   AST (SGOT) 19 26   ALT (SGPT) 23 19   (A) Abnormal value                  Lipid Panel    Lipid Panel 3/8/22 6/22/22   Total Cholesterol 139 121   Triglycerides 49 57   HDL Cholesterol 52 51   VLDL Cholesterol 11 13   LDL Cholesterol  76 57   LDL/HDL Ratio 1.48 1.15               Most Recent A1C    HGBA1C Most Recent 6/22/22   Hemoglobin A1C 6.60 (A)   (A) Abnormal value                     Assessment and Plan    Diagnoses and all orders for this visit:    1. Type 2 diabetes mellitus without complication, without long-term current use of insulin (HCC) (Primary)  Assessment & Plan:  Further  treatment pending lab results.    Orders:  -     Comprehensive metabolic panel; Future  -     Hemoglobin A1c; Future  -     Microalbumin / Creatinine Urine Ratio - Urine,  Clean Catch; Future  -     Lipid panel; Future    2. Mixed hyperlipidemia  Assessment & Plan:  No refills needed today.  He is not fasting.  Will return on Thursday morning to obtain fasting lab work.  Further treatment pending lab results.        Follow Up    No follow-ups on file.  Patient was given instructions and counseling regarding his condition or for health maintenance advice. Please see specific information pulled into the AVS if appropriate.

## 2022-09-29 ENCOUNTER — LAB (OUTPATIENT)
Dept: LAB | Facility: HOSPITAL | Age: 52
End: 2022-09-29

## 2022-09-29 DIAGNOSIS — E11.9 TYPE 2 DIABETES MELLITUS WITHOUT COMPLICATION, WITHOUT LONG-TERM CURRENT USE OF INSULIN: ICD-10-CM

## 2022-09-29 LAB
ALBUMIN SERPL-MCNC: 4.5 G/DL (ref 3.5–5.2)
ALBUMIN UR-MCNC: <1.2 MG/DL
ALBUMIN/GLOB SERPL: 2 G/DL
ALP SERPL-CCNC: 54 U/L (ref 39–117)
ALT SERPL W P-5'-P-CCNC: 20 U/L (ref 1–41)
ANION GAP SERPL CALCULATED.3IONS-SCNC: 10.8 MMOL/L (ref 5–15)
AST SERPL-CCNC: 25 U/L (ref 1–40)
BILIRUB SERPL-MCNC: 0.9 MG/DL (ref 0–1.2)
BUN SERPL-MCNC: 11 MG/DL (ref 6–20)
BUN/CREAT SERPL: 14.1 (ref 7–25)
CALCIUM SPEC-SCNC: 9.3 MG/DL (ref 8.6–10.5)
CHLORIDE SERPL-SCNC: 98 MMOL/L (ref 98–107)
CHOLEST SERPL-MCNC: 154 MG/DL (ref 0–200)
CO2 SERPL-SCNC: 27.2 MMOL/L (ref 22–29)
CREAT SERPL-MCNC: 0.78 MG/DL (ref 0.76–1.27)
CREAT UR-MCNC: 87.6 MG/DL
EGFRCR SERPLBLD CKD-EPI 2021: 108.6 ML/MIN/1.73
GLOBULIN UR ELPH-MCNC: 2.2 GM/DL
GLUCOSE SERPL-MCNC: 136 MG/DL (ref 65–99)
HBA1C MFR BLD: 6.2 % (ref 4.8–5.6)
HDLC SERPL-MCNC: 67 MG/DL (ref 40–60)
LDLC SERPL CALC-MCNC: 78 MG/DL (ref 0–100)
LDLC/HDLC SERPL: 1.19 {RATIO}
MICROALBUMIN/CREAT UR: NORMAL MG/G{CREAT}
POTASSIUM SERPL-SCNC: 4.4 MMOL/L (ref 3.5–5.2)
PROT SERPL-MCNC: 6.7 G/DL (ref 6–8.5)
SODIUM SERPL-SCNC: 136 MMOL/L (ref 136–145)
TRIGL SERPL-MCNC: 36 MG/DL (ref 0–150)
VLDLC SERPL-MCNC: 9 MG/DL (ref 5–40)

## 2022-09-29 PROCEDURE — 82570 ASSAY OF URINE CREATININE: CPT

## 2022-09-29 PROCEDURE — 80053 COMPREHEN METABOLIC PANEL: CPT

## 2022-09-29 PROCEDURE — 83036 HEMOGLOBIN GLYCOSYLATED A1C: CPT

## 2022-09-29 PROCEDURE — 82043 UR ALBUMIN QUANTITATIVE: CPT

## 2022-09-29 PROCEDURE — 36415 COLL VENOUS BLD VENIPUNCTURE: CPT

## 2022-09-29 PROCEDURE — 80061 LIPID PANEL: CPT

## 2022-12-02 ENCOUNTER — OFFICE VISIT (OUTPATIENT)
Dept: FAMILY MEDICINE CLINIC | Age: 52
End: 2022-12-02

## 2022-12-02 VITALS
TEMPERATURE: 98.5 F | DIASTOLIC BLOOD PRESSURE: 80 MMHG | HEART RATE: 71 BPM | HEIGHT: 71 IN | WEIGHT: 206.4 LBS | BODY MASS INDEX: 28.9 KG/M2 | SYSTOLIC BLOOD PRESSURE: 131 MMHG | OXYGEN SATURATION: 96 %

## 2022-12-02 DIAGNOSIS — R05.1 ACUTE COUGH: Primary | ICD-10-CM

## 2022-12-02 DIAGNOSIS — R52 BODY ACHES: ICD-10-CM

## 2022-12-02 DIAGNOSIS — R11.0 NAUSEA: ICD-10-CM

## 2022-12-02 DIAGNOSIS — J02.9 SORE THROAT: ICD-10-CM

## 2022-12-02 LAB
EXPIRATION DATE: NORMAL
EXPIRATION DATE: NORMAL
FLUAV AG UPPER RESP QL IA.RAPID: NOT DETECTED
FLUBV AG UPPER RESP QL IA.RAPID: NOT DETECTED
INTERNAL CONTROL: NORMAL
INTERNAL CONTROL: NORMAL
Lab: NORMAL
Lab: NORMAL
S PYO AG THROAT QL: NEGATIVE
SARS-COV-2 AG UPPER RESP QL IA.RAPID: NOT DETECTED

## 2022-12-02 PROCEDURE — U0004 COV-19 TEST NON-CDC HGH THRU: HCPCS

## 2022-12-02 PROCEDURE — 99213 OFFICE O/P EST LOW 20 MIN: CPT

## 2022-12-02 PROCEDURE — 87880 STREP A ASSAY W/OPTIC: CPT

## 2022-12-02 PROCEDURE — 87428 SARSCOV & INF VIR A&B AG IA: CPT

## 2022-12-02 PROCEDURE — 87081 CULTURE SCREEN ONLY: CPT

## 2022-12-02 RX ORDER — ONDANSETRON 4 MG/1
4 TABLET, ORALLY DISINTEGRATING ORAL EVERY 8 HOURS PRN
Status: CANCELLED | OUTPATIENT
Start: 2022-12-02

## 2022-12-02 NOTE — PROGRESS NOTES
Subjective     CHIEF COMPLAINT    Chief Complaint   Patient presents with   • Generalized Body Aches     Throwing up, relief afterwards. Headache, cough and congestion. Scratchy throat.        History of Present Illness  Patient is a 52 year old male, presents to the clinic today with complaints of fever, congestion, sinus pressure. He has a mild cough on and off. Symptoms began yesterday. His biggest complaint was no appetite yesterday with body aches. Felt much better after he vomited a little last night. Continues to have some fatigue, body aches and congestion. He was briefly exposed to strep, covid and flu at work on Monday. He denies any SOB, wheezing, chest pain. Has albuterol inhaler at home, has not needed it.        Review of Systems   Constitutional: Positive for fatigue and fever (tmax 103).   HENT: Positive for congestion and sinus pressure.    Respiratory: Positive for cough (mild, off and on ). Negative for shortness of breath and wheezing.    Cardiovascular: Negative for chest pain.   Musculoskeletal: Positive for myalgias.   Neurological: Positive for headaches.       No Known Allergies      Current Outpatient Medications on File Prior to Visit   Medication Sig Dispense Refill   • albuterol sulfate  (90 Base) MCG/ACT inhaler Inhale 2 puffs Every 4 (Four) Hours As Needed.     • amLODIPine (NORVASC) 5 MG tablet Take 5 mg by mouth Daily.     • lisinopril (PRINIVIL,ZESTRIL) 40 MG tablet Take 40 mg by mouth Daily.     • metFORMIN (GLUCOPHAGE) 1000 MG tablet TAKE ONE TABLET BY MOUTH TWICE DAILY 60 tablet 0   • multivitamin with minerals tablet tablet Take 1 tablet by mouth Daily.     • simvastatin (ZOCOR) 20 MG tablet Take 1 tablet by mouth Daily. 90 tablet 1   • SITagliptin (Januvia) 100 MG tablet Take 1 tablet by mouth Daily. 90 tablet 1     No current facility-administered medications on file prior to visit.       /80 (BP Location: Right arm, Patient Position: Sitting)   Pulse 71   Temp  "98.5 °F (36.9 °C) (Oral)   Ht 180.3 cm (70.98\")   Wt 93.6 kg (206 lb 6.4 oz)   SpO2 96%   BMI 28.80 kg/m²       Objective     Physical Exam  Vitals and nursing note reviewed.   Constitutional:       General: He is not in acute distress.     Appearance: Normal appearance. He is not ill-appearing.   HENT:      Head: Normocephalic and atraumatic.      Right Ear: Hearing, tympanic membrane, ear canal and external ear normal.      Left Ear: Hearing, tympanic membrane, ear canal and external ear normal.      Nose: Nose normal.      Mouth/Throat:      Mouth: Mucous membranes are moist.      Pharynx: Oropharynx is clear. No pharyngeal swelling, oropharyngeal exudate, posterior oropharyngeal erythema or uvula swelling.   Eyes:      Extraocular Movements: Extraocular movements intact.      Pupils: Pupils are equal, round, and reactive to light.   Cardiovascular:      Rate and Rhythm: Normal rate and regular rhythm.      Heart sounds: Normal heart sounds. No murmur heard.  Pulmonary:      Effort: Pulmonary effort is normal. No accessory muscle usage or respiratory distress.      Breath sounds: Normal breath sounds. No wheezing or rhonchi.   Musculoskeletal:      Cervical back: Normal range of motion.   Lymphadenopathy:      Cervical: No cervical adenopathy.   Skin:     General: Skin is warm and dry.   Neurological:      General: No focal deficit present.      Mental Status: He is alert and oriented to person, place, and time.      Gait: Gait is intact.   Psychiatric:         Mood and Affect: Mood and affect normal.         Behavior: Behavior normal.          Lab Results (last 24 hours)     Procedure Component Value Units Date/Time    POCT rapid strep A [[id number]] Collected: 12/02/22 1044    Specimen: Swab Updated: 12/02/22 1045     Rapid Strep A Screen Negative     Internal Control Passed     Lot Number [id number]     Expiration Date 2/28/24    POCT SARS-CoV-2 Antigen PADMINI + Flu [[id number]] Collected: 12/02/22 1101    " Specimen: Swab Updated: 12/02/22 1102     SARS Antigen Not Detected     Influenza A Antigen PADMINI Not Detected     Influenza B Antigen PADMINI Not Detected     Internal Control Passed     Lot Number [id number]     Expiration Date 9/6/23    Beta Strep Culture, Throat - Swab, Throat [[id number]] Collected: 12/02/22 1109    Specimen: Swab from Throat Updated: 12/02/22 1121    COVID-19,APTIMA PANTHER(DA),[facility], NP/OP SWAB IN UTM/VTM/SALINE TRANSPORT MEDIA,24 HR TAT - Swab, Nasopharynx [[id number]]  (Normal) Collected: 12/02/22 1115    Specimen: Swab from Nasopharynx Updated: 12/03/22 0134     COVID19 Not Detected    Narrative:      Fact sheet for providers: https://www.fda.gov/media/910255/download     Fact sheet for patients: https://www.fda.gov/media/444667/download    Test performed by RT PCR.             Diagnoses and all orders for this visit:    1. Acute cough (Primary)  Comments:  Negative rapid covid and flu. COVID PCR sent due to exposure. Recommend mucinex  Orders:  -     POCT SARS-CoV-2 Antigen PADMINI + Flu  -     COVID-19,APTIMA PANTHER(DA),[facility], NP/OP SWAB IN UTM/VTM/SALINE TRANSPORT MEDIA,24 HR TAT - Swab, Nasopharynx    2. Sore throat  Comments:  Rapid strep negative, culture sent and pending. Will treat accordingly.   Orders:  -     POCT rapid strep A  -     Beta Strep Culture, Throat - , Throat; Future  -     Beta Strep Culture, Throat - Swab, Throat    3. Nausea  Comments:  Declines Zofran     4. Body aches  Comments:  OTC analgesics       No concerning findings on exam, patient reports feeling better. Likely a viral illness, no evidence of bacterial infection at this time. Recommend supportive care. Increase fluids, rest. For any shortness of breath or chest pain, patient was instructed to proceed to ER. For any worsening symptoms or new symptoms, patient to return to clinic. Patient voiced understanding of all instructions and had no further questions at this time.     Work note provided, may  return 12/4/22, pending results of PCR swab. Should PCR swab be positive, would recommend isolation per CDC guidelines.     Addendum 12/3/22: COVID PCR negative, no changes to plan of care.       Follow up:  Return if symptoms worsen or fail to improve.  Patient was given instructions and counseling regarding his condition or for health maintenance advice. Please see specific information pulled into the AVS if appropriate.

## 2022-12-03 LAB — SARS-COV-2 RNA PNL SPEC NAA+PROBE: NOT DETECTED

## 2022-12-04 LAB — BACTERIA SPEC AEROBE CULT: NORMAL

## 2022-12-09 DIAGNOSIS — E11.9 TYPE 2 DIABETES MELLITUS WITHOUT COMPLICATION, WITHOUT LONG-TERM CURRENT USE OF INSULIN: ICD-10-CM

## 2022-12-09 RX ORDER — SITAGLIPTIN 100 MG/1
TABLET, FILM COATED ORAL
Qty: 90 TABLET | Refills: 0 | Status: SHIPPED | OUTPATIENT
Start: 2022-12-09 | End: 2023-01-26 | Stop reason: CLARIF

## 2022-12-12 ENCOUNTER — HOSPITAL ENCOUNTER (OUTPATIENT)
Dept: GENERAL RADIOLOGY | Facility: HOSPITAL | Age: 52
Discharge: HOME | End: 2022-12-12

## 2022-12-12 ENCOUNTER — OFFICE VISIT (OUTPATIENT)
Dept: FAMILY MEDICINE CLINIC | Age: 52
End: 2022-12-12

## 2022-12-12 VITALS
DIASTOLIC BLOOD PRESSURE: 80 MMHG | BODY MASS INDEX: 28.9 KG/M2 | TEMPERATURE: 98.4 F | HEART RATE: 73 BPM | HEIGHT: 71 IN | SYSTOLIC BLOOD PRESSURE: 124 MMHG | OXYGEN SATURATION: 98 % | WEIGHT: 206.4 LBS

## 2022-12-12 DIAGNOSIS — E78.2 MIXED HYPERLIPIDEMIA: ICD-10-CM

## 2022-12-12 DIAGNOSIS — R05.1 ACUTE COUGH: ICD-10-CM

## 2022-12-12 DIAGNOSIS — J01.90 ACUTE NON-RECURRENT SINUSITIS, UNSPECIFIED LOCATION: Primary | ICD-10-CM

## 2022-12-12 LAB
EXPIRATION DATE: NORMAL
FLUAV AG UPPER RESP QL IA.RAPID: NOT DETECTED
FLUBV AG UPPER RESP QL IA.RAPID: NOT DETECTED
INTERNAL CONTROL: NORMAL
Lab: NORMAL
SARS-COV-2 AG UPPER RESP QL IA.RAPID: NOT DETECTED

## 2022-12-12 PROCEDURE — 87428 SARSCOV & INF VIR A&B AG IA: CPT

## 2022-12-12 PROCEDURE — 99213 OFFICE O/P EST LOW 20 MIN: CPT

## 2022-12-12 PROCEDURE — 71046 X-RAY EXAM CHEST 2 VIEWS: CPT

## 2022-12-12 RX ORDER — DEXTROMETHORPHAN HYDROBROMIDE AND PROMETHAZINE HYDROCHLORIDE 15; 6.25 MG/5ML; MG/5ML
5 SYRUP ORAL NIGHTLY PRN
Qty: 118 ML | Refills: 0 | Status: SHIPPED | OUTPATIENT
Start: 2022-12-12 | End: 2022-12-19

## 2022-12-12 RX ORDER — ALBUTEROL SULFATE 90 UG/1
2 AEROSOL, METERED RESPIRATORY (INHALATION) EVERY 4 HOURS PRN
Qty: 18 G | Refills: 0 | Status: SHIPPED | OUTPATIENT
Start: 2022-12-12

## 2022-12-12 RX ORDER — AMOXICILLIN AND CLAVULANATE POTASSIUM 875; 125 MG/1; MG/1
1 TABLET, FILM COATED ORAL 2 TIMES DAILY
Status: CANCELLED | OUTPATIENT
Start: 2022-12-12

## 2022-12-12 RX ORDER — AMOXICILLIN AND CLAVULANATE POTASSIUM 875; 125 MG/1; MG/1
1 TABLET, FILM COATED ORAL 2 TIMES DAILY
Qty: 14 TABLET | Refills: 0 | Status: SHIPPED | OUTPATIENT
Start: 2022-12-12 | End: 2022-12-19

## 2022-12-12 NOTE — PROGRESS NOTES
Subjective     CHIEF COMPLAINT    Chief Complaint   Patient presents with   • Earache     No better from last appointment. 12/02/22, coughing, no energy, headaches. Mucus is clear/green.      History of Present Illness  Patient is a 52-year-old male who presents to the clinic today with complaints of congestion, sinus pressure/pain, cough and fatigue.  Denies any fever, shortness of breath, chest pain, wheezing.  He was seen in clinic on 12-2-2022, was negative for COVID (both rapid and PCR), flu and strep and treated as a viral illness.  He reports he did feel better for a few days after that but then began feeling worse again.  He also endorses his ears as feeling full.  He has tried Mucinex, Tylenol and used a Neti pot for his symptoms.  His biggest complaint is the sinus pressure and pain.      Review of Systems   Constitutional: Positive for chills and fatigue. Negative for fever.   HENT: Positive for congestion, sinus pressure and sinus pain.    Respiratory: Positive for cough. Negative for shortness of breath and wheezing.    Cardiovascular: Negative for chest pain.   Musculoskeletal: Negative for myalgias.   Neurological: Positive for headaches.       No Known Allergies      Current Outpatient Medications on File Prior to Visit   Medication Sig Dispense Refill   • amLODIPine (NORVASC) 5 MG tablet Take 5 mg by mouth Daily.     • Januvia 100 MG tablet TAKE ONE TABLET BY MOUTH EVERY DAY 90 tablet 0   • lisinopril (PRINIVIL,ZESTRIL) 40 MG tablet Take 40 mg by mouth Daily.     • metFORMIN (GLUCOPHAGE) 1000 MG tablet TAKE ONE TABLET BY MOUTH TWICE DAILY 180 tablet 0   • multivitamin with minerals tablet tablet Take 1 tablet by mouth Daily.     • simvastatin (ZOCOR) 20 MG tablet Take 1 tablet by mouth Daily. 90 tablet 1   • [DISCONTINUED] albuterol sulfate  (90 Base) MCG/ACT inhaler Inhale 2 puffs Every 4 (Four) Hours As Needed.       No current facility-administered medications on file prior to visit.  "      /80 (BP Location: Left arm, Patient Position: Sitting)   Pulse 73   Temp 98.4 °F (36.9 °C) (Oral)   Ht 180.3 cm (70.98\")   Wt 93.6 kg (206 lb 6.4 oz)   SpO2 98%   BMI 28.80 kg/m²          Objective     Physical Exam  Vitals and nursing note reviewed.   Constitutional:       General: He is not in acute distress.     Appearance: Normal appearance. He is not ill-appearing.   HENT:      Head: Normocephalic and atraumatic.      Right Ear: Hearing, tympanic membrane, ear canal and external ear normal.      Left Ear: Hearing, tympanic membrane, ear canal and external ear normal.      Nose: Congestion present.      Right Sinus: No maxillary sinus tenderness or frontal sinus tenderness.      Left Sinus: No maxillary sinus tenderness or frontal sinus tenderness.      Mouth/Throat:      Lips: Pink.      Mouth: Mucous membranes are moist.      Pharynx: Oropharynx is clear. Uvula midline. No pharyngeal swelling, oropharyngeal exudate, posterior oropharyngeal erythema or uvula swelling.   Eyes:      Extraocular Movements: Extraocular movements intact.      Pupils: Pupils are equal, round, and reactive to light.   Cardiovascular:      Rate and Rhythm: Normal rate and regular rhythm.      Heart sounds: Normal heart sounds. No murmur heard.  Pulmonary:      Effort: Pulmonary effort is normal. No accessory muscle usage or respiratory distress.      Breath sounds: Normal breath sounds. No wheezing or rhonchi.   Musculoskeletal:      Cervical back: Normal range of motion.   Lymphadenopathy:      Cervical: No cervical adenopathy.   Skin:     General: Skin is warm and dry.   Neurological:      General: No focal deficit present.      Mental Status: He is alert and oriented to person, place, and time.      Gait: Gait is intact.   Psychiatric:         Mood and Affect: Mood and affect normal.         Behavior: Behavior normal.          Lab Results (last 24 hours)     Procedure Component Value Units Date/Time    POCT " SARS-CoV-2 Antigen PADMINI + Flu [[id number]] Collected: 12/12/22 1016    Specimen: Swab Updated: 12/12/22 1031     SARS Antigen Not Detected     Influenza A Antigen PADMINI Not Detected     Influenza B Antigen PADMINI Not Detected     Internal Control Passed     Lot Number [id number]     Expiration Date 09/06/23          XR Chest PA & Lateral    Result Date: 12/12/2022  PROCEDURE: XR CHEST PA AND LATERAL  COMPARISON: [facility], CHEST PA/AP & LAT 2V, 6/09/2017, 10:22.  INDICATIONS: COUGH, CONGESTION, FATIGUE SINCE  12/2/22  FINDINGS:  Heart size and pulmonary vasculature within normal limits.  Hilar and mediastinal contours normal.  Lungs clear.  Costophrenic angles sharp       No active cardiopulmonary disease     ED CHEW MD       Electronically Signed and Approved By: ED CHEW MD on 12/12/2022 at 11:16                   Diagnoses and all orders for this visit:    1. Acute non-recurrent sinusitis, unspecified location (Primary)  Comments:  Evidence of bacterial infection given duration of symptoms and second sickening syndrome.  We will treat with antibiotics.  Increase fluids, rest  Orders:  -     amoxicillin-clavulanate (Augmentin) 875-125 MG per tablet; Take 1 tablet by mouth 2 (Two) Times a Day for 7 days.  Dispense: 14 tablet; Refill: 0    2. Acute cough  Comments:  Chest x-ray today to rule out pneumonia.  Recommend Mucinex during the day, Promethazine DM at night.  Orders:  -     POCT SARS-CoV-2 Antigen PADMINI + Flu  -     XR Chest PA & Lateral; Future  -     albuterol sulfate  (90 Base) MCG/ACT inhaler; Inhale 2 puffs Every 4 (Four) Hours As Needed for Wheezing or Shortness of Air.  Dispense: 18 g; Refill: 0  -     promethazine-dextromethorphan (PROMETHAZINE-DM) 6.25-15 MG/5ML syrup; Take 5 mL by mouth At Night As Needed for Cough for up to 7 days.  Dispense: 118 mL; Refill: 0      No active cardiopulmonary disease on chest x-ray.  Will treat with antibiotics as above for sinusitis.   Patient requested refill of albuterol inhaler.  For any shortness of breath or chest pain, patient was instructed to proceed to ER. Patient voiced understanding of all instructions and had no further questions at this time.     Follow up:  Return if symptoms worsen or fail to improve.  Patient was given instructions and counseling regarding his condition or for health maintenance advice. Please see specific information pulled into the AVS if appropriate.

## 2022-12-13 RX ORDER — SIMVASTATIN 20 MG
TABLET ORAL
Qty: 90 TABLET | Refills: 0 | Status: SHIPPED | OUTPATIENT
Start: 2022-12-13 | End: 2023-03-09

## 2023-01-06 ENCOUNTER — OFFICE VISIT (OUTPATIENT)
Dept: FAMILY MEDICINE CLINIC | Age: 53
End: 2023-01-06
Payer: COMMERCIAL

## 2023-01-06 VITALS
SYSTOLIC BLOOD PRESSURE: 104 MMHG | WEIGHT: 217 LBS | OXYGEN SATURATION: 97 % | BODY MASS INDEX: 30.38 KG/M2 | TEMPERATURE: 97.7 F | DIASTOLIC BLOOD PRESSURE: 61 MMHG | HEIGHT: 71 IN | HEART RATE: 73 BPM

## 2023-01-06 DIAGNOSIS — J45.21 MILD INTERMITTENT ASTHMA WITH ACUTE EXACERBATION: Primary | ICD-10-CM

## 2023-01-06 PROCEDURE — 99214 OFFICE O/P EST MOD 30 MIN: CPT | Performed by: FAMILY MEDICINE

## 2023-01-06 RX ORDER — PREDNISONE 20 MG/1
40 TABLET ORAL DAILY
Qty: 10 TABLET | Refills: 0 | Status: SHIPPED | OUTPATIENT
Start: 2023-01-06 | End: 2023-01-11

## 2023-01-06 NOTE — Clinical Note
Ethan Byrd -- GER, I suspect this gentleman may have asthma that has never been formally diagnosed.  I'm treating him as such today, but you may want to consider sending him for PFTs to check on this when you see him for his routine visit in March.  Thanks!  ISABELLE

## 2023-01-06 NOTE — PROGRESS NOTES
Chief Complaint  Cough (Congestion, drainage. Was seen on 12/12 for sinusitis. Sx have been lingering since. )    Subjective          Moe Page presents to [facility] today for acute complaint of cough and congestion.  He was seen on 12/12/2022 by Tara Johnson who treated him for maxillary sinusitis at that time with a course of Augmentin on 7 days.  Flu and COVID were negative at that time.  He did feel better after taking the Augmentin course.  However, sx plateaued after that and have not noticeably improved since.  He feels a lot of chest congestion and nasal congestion.  +Cough, productive of yellow sputum.  +Nasal congestion and rhinorrhea as well.  No SOB or CP.  No fevers, occasional chills (when he is outside).  He has an albuterol inhaler that he has used whenever he gets ill for years.  He has been told that he wheezes (\"but I've never paid attention to it myself\").  He has never been formally diagnosed with asthma.        Objective   Vital Signs:   Vitals:    01/06/23 0932   BP: 104/61   BP Location: Left arm   Patient Position: Sitting   Pulse: 73   Temp: 97.7 °F (36.5 °C)   TempSrc: Oral   SpO2: 97%   Weight: 98.4 kg (217 lb)   Height: 180.3 cm (70.98\")     Physical Exam  Vitals reviewed.   Constitutional:       General: He is not in acute distress.     Appearance: Normal appearance.   HENT:      Right Ear: Tympanic membrane, ear canal and external ear normal.      Left Ear: Tympanic membrane, ear canal and external ear normal.      Nose: Congestion and rhinorrhea present.      Right Turbinates: Swollen and pale.      Left Turbinates: Swollen and pale.   Eyes:      Extraocular Movements: Extraocular movements intact.      Pupils: Pupils are equal, round, and reactive to light.   Cardiovascular:      Rate and Rhythm: Normal rate and regular rhythm.      Heart sounds: No murmur heard.  Pulmonary:      Effort: Pulmonary effort is normal.      Breath sounds:  Wheezing (elicited with cough) present. No rhonchi or rales.   Abdominal:      General: Bowel sounds are normal.      Tenderness: There is no abdominal tenderness.   Musculoskeletal:         General: Normal range of motion.   Neurological:      Mental Status: He is alert.             Assessment and Plan    Diagnoses and all orders for this visit:    1. Mild intermittent asthma with acute exacerbation (Primary)  Assessment & Plan:  I am suspicious that Balta may actually have undiagnosed asthma.  He has carried an albuterol inhaler for years which he uses whenever he gets sick but has never been formally diagnosed with asthma.  He received an adequate course of Augmentin a few weeks ago for presumed sinusitis.  Symptoms did improve somewhat following the course but then plateaued and just never really got better after that.  His main symptoms continue to linger in the chest with cough and chest congestion.  He reports that other people tell him he wheezes although he does not notice this himself, but does report notable improvement with use of his albuterol inhaler.  I am going to try burst of prednisone for him today.  He has scheduled follow-up with his PCP in March, which would be the perfect amount of time to wait before sending him for PFTs to further evaluate and perhaps confirm a diagnosis of asthma.  I will communicate this with Rochelle Louis so that she can pursue this further if she wishes.    Orders:  -     predniSONE (DELTASONE) 20 MG tablet; Take 2 tablets by mouth Daily for 5 days.  Dispense: 10 tablet; Refill: 0       Follow Up   Return if symptoms worsen or fail to improve.  Patient was given instructions and counseling regarding his condition or for health maintenance advice. Please see specific information pulled into the AVS if appropriate.          01/06/2023

## 2023-01-06 NOTE — ASSESSMENT & PLAN NOTE
I am suspicious that Balta may actually have undiagnosed asthma.  He has carried an albuterol inhaler for years which he uses whenever he gets sick but has never been formally diagnosed with asthma.  He received an adequate course of Augmentin a few weeks ago for presumed sinusitis.  Symptoms did improve somewhat following the course but then plateaued and just never really got better after that.  His main symptoms continue to linger in the chest with cough and chest congestion.  He reports that other people tell him he wheezes although he does not notice this himself, but does report notable improvement with use of his albuterol inhaler.  I am going to try burst of prednisone for him today.  He has scheduled follow-up with his PCP in March, which would be the perfect amount of time to wait before sending him for PFTs to further evaluate and perhaps confirm a diagnosis of asthma.  I will communicate this with Rochelle Louis so that she can pursue this further if she wishes.

## 2023-01-18 ENCOUNTER — PRIOR AUTHORIZATION (OUTPATIENT)
Dept: FAMILY MEDICINE CLINIC | Age: 53
End: 2023-01-18
Payer: COMMERCIAL

## 2023-01-26 DIAGNOSIS — E11.9 TYPE 2 DIABETES MELLITUS WITHOUT COMPLICATION, WITHOUT LONG-TERM CURRENT USE OF INSULIN: Primary | ICD-10-CM

## 2023-02-21 ENCOUNTER — TELEPHONE (OUTPATIENT)
Dept: FAMILY MEDICINE CLINIC | Age: 53
End: 2023-02-21
Payer: COMMERCIAL

## 2023-02-21 NOTE — TELEPHONE ENCOUNTER
Ok to dc tradjenta and restart glipizide.  I recall that he was having low blood sugar reading concerns with use of glipizde, which is not uncommon.  He could eat smaller amounts more often and more lean protein to avoid low blood sugar episodes.  He could also try glimperide  which is in same medicine category and could be prescribed at 1 mg once daily.

## 2023-02-21 NOTE — TELEPHONE ENCOUNTER
Pt states that the Trajenta was going to cost $40.00/mo. He doesn't want to pay that for 1 med.  He says that he is losing weight and trying to come off of some of the meds.  He says that he would be willing to go back to Glipizide because that was cheap.  He says hes not trying to be difficult, he just doesn't want to pay that price for one med.  He says he has an appt in about a month and will talk to you about it then if you are ok with that.

## 2023-02-22 DIAGNOSIS — E11.9 TYPE 2 DIABETES MELLITUS WITHOUT COMPLICATION, WITHOUT LONG-TERM CURRENT USE OF INSULIN: Primary | ICD-10-CM

## 2023-02-22 RX ORDER — GLIMEPIRIDE 1 MG/1
1 TABLET ORAL
Qty: 30 TABLET | Refills: 3 | Status: SHIPPED | OUTPATIENT
Start: 2023-02-22 | End: 2023-03-28

## 2023-02-22 NOTE — TELEPHONE ENCOUNTER
Glimepiride 1 mg once daily sent to [facility] pharmacy.  Diabetes panel lab order entered for approximately March 20

## 2023-02-22 NOTE — TELEPHONE ENCOUNTER
Inf pt, he is agreeable to Glimeperide 1mg to be sent to our pharmacy.  He also would like lab orders so he can discuss his labs at his ov.

## 2023-03-09 DIAGNOSIS — E11.9 TYPE 2 DIABETES MELLITUS WITHOUT COMPLICATION, WITHOUT LONG-TERM CURRENT USE OF INSULIN: ICD-10-CM

## 2023-03-09 DIAGNOSIS — E78.2 MIXED HYPERLIPIDEMIA: ICD-10-CM

## 2023-03-09 RX ORDER — SIMVASTATIN 20 MG
TABLET ORAL
Qty: 90 TABLET | Refills: 0 | Status: SHIPPED | OUTPATIENT
Start: 2023-03-09 | End: 2023-03-28 | Stop reason: SDUPTHER

## 2023-03-23 ENCOUNTER — LAB (OUTPATIENT)
Dept: LAB | Facility: HOSPITAL | Age: 53
End: 2023-03-23
Payer: COMMERCIAL

## 2023-03-23 DIAGNOSIS — E11.9 TYPE 2 DIABETES MELLITUS WITHOUT COMPLICATION, WITHOUT LONG-TERM CURRENT USE OF INSULIN: ICD-10-CM

## 2023-03-23 LAB
ALBUMIN SERPL-MCNC: 4.5 G/DL (ref 3.5–5.2)
ALBUMIN/GLOB SERPL: 1.9 G/DL
ALP SERPL-CCNC: 74 U/L (ref 39–117)
ALT SERPL W P-5'-P-CCNC: 24 U/L (ref 1–41)
ANION GAP SERPL CALCULATED.3IONS-SCNC: 11 MMOL/L (ref 5–15)
AST SERPL-CCNC: 30 U/L (ref 1–40)
BILIRUB SERPL-MCNC: 0.6 MG/DL (ref 0–1.2)
BUN SERPL-MCNC: 15 MG/DL (ref 6–20)
BUN/CREAT SERPL: 20.8 (ref 7–25)
CALCIUM SPEC-SCNC: 9.1 MG/DL (ref 8.6–10.5)
CHLORIDE SERPL-SCNC: 101 MMOL/L (ref 98–107)
CHOLEST SERPL-MCNC: 151 MG/DL (ref 0–200)
CO2 SERPL-SCNC: 25 MMOL/L (ref 22–29)
CREAT SERPL-MCNC: 0.72 MG/DL (ref 0.76–1.27)
EGFRCR SERPLBLD CKD-EPI 2021: 111.3 ML/MIN/1.73
GLOBULIN UR ELPH-MCNC: 2.4 GM/DL
GLUCOSE SERPL-MCNC: 124 MG/DL (ref 65–99)
HBA1C MFR BLD: 5.8 % (ref 4.8–5.6)
HDLC SERPL-MCNC: 70 MG/DL (ref 40–60)
LDLC SERPL CALC-MCNC: 69 MG/DL (ref 0–100)
LDLC/HDLC SERPL: 1 {RATIO}
POTASSIUM SERPL-SCNC: 4.2 MMOL/L (ref 3.5–5.2)
PROT SERPL-MCNC: 6.9 G/DL (ref 6–8.5)
SODIUM SERPL-SCNC: 137 MMOL/L (ref 136–145)
TRIGL SERPL-MCNC: 56 MG/DL (ref 0–150)
VLDLC SERPL-MCNC: 12 MG/DL (ref 5–40)

## 2023-03-23 PROCEDURE — 80053 COMPREHEN METABOLIC PANEL: CPT

## 2023-03-23 PROCEDURE — 83036 HEMOGLOBIN GLYCOSYLATED A1C: CPT

## 2023-03-23 PROCEDURE — 36415 COLL VENOUS BLD VENIPUNCTURE: CPT

## 2023-03-23 PROCEDURE — 80061 LIPID PANEL: CPT

## 2023-03-24 NOTE — PROGRESS NOTES
Diabetes is under good control.  Lipid panel is normal.  HDL is good cholesterol.  Kidney and liver function are normal.  Keep up the good work.

## 2023-03-28 ENCOUNTER — OFFICE VISIT (OUTPATIENT)
Dept: FAMILY MEDICINE CLINIC | Age: 53
End: 2023-03-28
Payer: COMMERCIAL

## 2023-03-28 VITALS
BODY MASS INDEX: 29.4 KG/M2 | OXYGEN SATURATION: 99 % | HEIGHT: 71 IN | WEIGHT: 210 LBS | DIASTOLIC BLOOD PRESSURE: 68 MMHG | SYSTOLIC BLOOD PRESSURE: 106 MMHG | HEART RATE: 70 BPM

## 2023-03-28 DIAGNOSIS — I10 ESSENTIAL HYPERTENSION: ICD-10-CM

## 2023-03-28 DIAGNOSIS — G47.33 OBSTRUCTIVE SLEEP APNEA: ICD-10-CM

## 2023-03-28 DIAGNOSIS — Z12.11 COLON CANCER SCREENING: ICD-10-CM

## 2023-03-28 DIAGNOSIS — Z12.5 PROSTATE CANCER SCREENING: ICD-10-CM

## 2023-03-28 DIAGNOSIS — E78.2 MIXED HYPERLIPIDEMIA: ICD-10-CM

## 2023-03-28 DIAGNOSIS — E11.9 TYPE 2 DIABETES MELLITUS WITHOUT COMPLICATION, WITHOUT LONG-TERM CURRENT USE OF INSULIN: Primary | ICD-10-CM

## 2023-03-28 RX ORDER — SIMVASTATIN 20 MG
20 TABLET ORAL
Qty: 90 TABLET | Refills: 1 | Status: SHIPPED | OUTPATIENT
Start: 2023-03-28

## 2023-03-28 NOTE — ASSESSMENT & PLAN NOTE
Diabetes is under very good control.  Will discontinue glimepiride but continue metformin 1000 mg twice per day.  Repeat labs in 90 days to assure the diabetes control remains stable off glimepiride.

## 2023-03-28 NOTE — PROGRESS NOTES
"Chief Complaint  Hypertension (6 month follow - due for colonoscopy ), Hyperlipidemia, and Diabetes    Subjective          Moe Page presents to [facility]     waist circ. 40 inches-biometric form completed per patient request    Patient is a 52-year-old male who is here today to follow-up regarding type 2 diabetes.  Recent hemoglobin A1c was 5.8.  He is taking metformin 1000 mg twice per day and glimepiride 1 mg once per day.  Previous low blood sugars on glipizide.  Insurance would not cover Januvia.  Tradjenta was too expensive.  Therefore has been taking glimepiride.  He is having success with intentional weight loss and feels as if diabetes is under very good control and his diet is good.  Has had an annual eye exam.    Regarding hyperlipidemia he takes simvastatin 20 mg at bedtime.  Denies side effects and requests refills.    Cardiology prescribes lisinopril and amlodipine.     Objective   Vital Signs:   Vitals:    03/28/23 1042   BP: 106/68   BP Location: Left arm   Patient Position: Sitting   Cuff Size: Large Adult   Pulse: 70   SpO2: 99%   Weight: 95.3 kg (210 lb)   Height: 180.3 cm (70.98\")      Body mass index is 29.31 kg/m².  Physical Exam  Vitals reviewed.   Constitutional:       General: He is not in acute distress.     Appearance: Normal appearance. He is well-developed.   Cardiovascular:      Rate and Rhythm: Normal rate and regular rhythm.      Heart sounds: Normal heart sounds.   Pulmonary:      Effort: Pulmonary effort is normal.      Breath sounds: Normal breath sounds.   Musculoskeletal:      Right lower leg: No edema.      Left lower leg: No edema.   Skin:     General: Skin is warm and dry.   Neurological:      General: No focal deficit present.      Mental Status: He is alert.   Psychiatric:         Attention and Perception: Attention normal.         Mood and Affect: Mood and affect normal.         Behavior: Behavior normal.           Current " Outpatient Medications:   •  albuterol sulfate  (90 Base) MCG/ACT inhaler, Inhale 2 puffs Every 4 (Four) Hours As Needed for Wheezing or Shortness of Air., Disp: 18 g, Rfl: 0  •  amLODIPine (NORVASC) 5 MG tablet, Take 1 tablet by mouth Daily., Disp: , Rfl:   •  lisinopril (PRINIVIL,ZESTRIL) 40 MG tablet, Take 1 tablet by mouth Daily., Disp: , Rfl:   •  metFORMIN (GLUCOPHAGE) 1000 MG tablet, Take 1 tablet by mouth 2 (Two) Times a Day., Disp: 180 tablet, Rfl: 1  •  multivitamin with minerals tablet tablet, Take 1 tablet by mouth Daily., Disp: , Rfl:   •  simvastatin (ZOCOR) 20 MG tablet, Take 1 tablet by mouth every night at bedtime., Disp: 90 tablet, Rfl: 1   Past Medical History:   Diagnosis Date   • Abnormal results of liver function studies    • COVID-19 10/2020   • Enlarged aorta (HCC)    • Essential (primary) hypertension    • Mixed hyperlipidemia    • Obstructive sleep apnea (adult) (pediatric)     SLEEP STUDY   • Type 2 diabetes mellitus with hyperglycemia (HCC)    • Vitamin D deficiency          Result Review :                Assessment and Plan    Diagnoses and all orders for this visit:    1. Type 2 diabetes mellitus without complication, without long-term current use of insulin (HCC) (Primary)  Assessment & Plan:  Diabetes is under very good control.  Will discontinue glimepiride but continue metformin 1000 mg twice per day.  Repeat labs in 90 days to assure the diabetes control remains stable off glimepiride.    Orders:  -     Comprehensive metabolic panel; Future  -     Hemoglobin A1c; Future  -     Lipid panel; Future  -     metFORMIN (GLUCOPHAGE) 1000 MG tablet; Take 1 tablet by mouth 2 (Two) Times a Day.  Dispense: 180 tablet; Refill: 1    2. Mixed hyperlipidemia  -     simvastatin (ZOCOR) 20 MG tablet; Take 1 tablet by mouth every night at bedtime.  Dispense: 90 tablet; Refill: 1    3. Essential hypertension  Assessment & Plan:  Continue per cardiology.      4. Obstructive sleep  apnea  Assessment & Plan:  Not currently using CPAP.  Thinks it has not been necessary since intentional weight loss.      5. Colon cancer screening  Assessment & Plan:  Patient is agreeable to referral.  He first wants to ask which surgeon a friend works for.  He will call back to let me know where he wants this referral to be placed.      6. Prostate cancer screening  -     PSA SCREENING; Future      Follow Up    No follow-ups on file.  Patient was given instructions and counseling regarding his condition or for health maintenance advice. Please see specific information pulled into the AVS if appropriate.

## 2023-03-28 NOTE — ASSESSMENT & PLAN NOTE
Patient is agreeable to referral.  He first wants to ask which surgeon a friend works for.  He will call back to let me know where he wants this referral to be placed.

## 2023-06-07 DIAGNOSIS — E78.2 MIXED HYPERLIPIDEMIA: ICD-10-CM

## 2023-06-09 DIAGNOSIS — E11.9 TYPE 2 DIABETES MELLITUS WITHOUT COMPLICATION, WITHOUT LONG-TERM CURRENT USE OF INSULIN: ICD-10-CM

## 2023-06-09 RX ORDER — SIMVASTATIN 20 MG
TABLET ORAL
Qty: 90 TABLET | Refills: 0 | Status: SHIPPED | OUTPATIENT
Start: 2023-06-09

## 2023-09-28 ENCOUNTER — OFFICE VISIT (OUTPATIENT)
Dept: FAMILY MEDICINE CLINIC | Age: 53
End: 2023-09-28
Payer: COMMERCIAL

## 2023-09-28 VITALS
HEART RATE: 75 BPM | WEIGHT: 212 LBS | SYSTOLIC BLOOD PRESSURE: 110 MMHG | OXYGEN SATURATION: 98 % | DIASTOLIC BLOOD PRESSURE: 72 MMHG | HEIGHT: 71 IN | BODY MASS INDEX: 29.68 KG/M2

## 2023-09-28 DIAGNOSIS — E78.2 MIXED HYPERLIPIDEMIA: ICD-10-CM

## 2023-09-28 DIAGNOSIS — E11.9 TYPE 2 DIABETES MELLITUS WITHOUT COMPLICATION, WITHOUT LONG-TERM CURRENT USE OF INSULIN: Primary | ICD-10-CM

## 2023-09-28 LAB
EXPIRATION DATE: ABNORMAL
EXPIRATION DATE: NORMAL
GLUCOSE BLDC GLUCOMTR-MCNC: 134 MG/DL (ref 70–130)
HBA1C MFR BLD: 6.3 %
Lab: ABNORMAL
Lab: NORMAL

## 2023-09-28 RX ORDER — SIMVASTATIN 20 MG
20 TABLET ORAL
Qty: 90 TABLET | Refills: 1 | Status: SHIPPED | OUTPATIENT
Start: 2023-09-28

## 2023-09-28 NOTE — ASSESSMENT & PLAN NOTE
Diabetes is stable with hemoglobin A1c of 6.3.  Continue current treatment and follow-up in 6 months or sooner if concerns.  Patient will have labs done prior to next visit.

## 2023-09-28 NOTE — PROGRESS NOTES
"Chief Complaint  Diabetes (6 month follow up - wants labs before appt ), Hypertension, Hyperlipidemia, and Sleep Apnea    Subjective          Moe Page presents to [facility]     Patient is a 53-year-old male who is here today to follow-up regarding type 2 diabetes.  Currently taking metformin 1000 mg twice per day.  Glimepiride was discontinued in March when his hemoglobin A1c decreased to 5.8.  He was also intermittently experiencing low blood sugar episodes.  On metformin 1000 mg twice per day his hemoglobin A1c remained pretty stable at 6% on July 5.  Patient reports he is doing well.  Glucose level to be anywhere from  fasting.    For hyperlipidemia takes simvastatin 20 mg at bedtime.  Denies side effects and requests refills.    Audiology prescribes amlodipine and lisinopril.     Objective   Vital Signs:   Vitals:    09/28/23 1533   BP: 110/72   BP Location: Left arm   Patient Position: Sitting   Cuff Size: Adult   Pulse: 75   SpO2: 98%   Weight: 96.2 kg (212 lb)   Height: 180.3 cm (70.98\")       Wt Readings from Last 3 Encounters:   09/28/23 96.2 kg (212 lb)   03/28/23 95.3 kg (210 lb)   01/06/23 98.4 kg (217 lb)      BP Readings from Last 3 Encounters:   09/28/23 110/72   03/28/23 106/68   01/06/23 104/61       Body mass index is 29.58 kg/m².             Physical Exam  Vitals reviewed.   Constitutional:       General: He is not in acute distress.     Appearance: Normal appearance. He is well-developed.   Cardiovascular:      Rate and Rhythm: Normal rate and regular rhythm.      Heart sounds: Normal heart sounds.   Pulmonary:      Effort: Pulmonary effort is normal.      Breath sounds: Normal breath sounds.   Musculoskeletal:      Right lower leg: No edema.      Left lower leg: No edema.   Skin:     General: Skin is warm and dry.   Neurological:      General: No focal deficit present.      Mental Status: He is alert.   Psychiatric:         Attention and " Perception: Attention normal.         Mood and Affect: Mood and affect normal.         Behavior: Behavior normal.         Current Outpatient Medications:     albuterol sulfate  (90 Base) MCG/ACT inhaler, Inhale 2 puffs Every 4 (Four) Hours As Needed for Wheezing or Shortness of Air., Disp: 18 g, Rfl: 0    amLODIPine (NORVASC) 5 MG tablet, Take 1 tablet by mouth Daily., Disp: , Rfl:     lisinopril (PRINIVIL,ZESTRIL) 40 MG tablet, Take 1 tablet by mouth Daily., Disp: , Rfl:     metFORMIN (GLUCOPHAGE) 1000 MG tablet, Take 1 tablet by mouth 2 (Two) Times a Day., Disp: 180 tablet, Rfl: 1    multivitamin with minerals tablet tablet, Take 1 tablet by mouth Daily., Disp: , Rfl:     simvastatin (ZOCOR) 20 MG tablet, Take 1 tablet by mouth every night at bedtime., Disp: 90 tablet, Rfl: 1   Past Medical History:   Diagnosis Date    Abnormal results of liver function studies     COVID-19 10/2020    Enlarged aorta     Essential (primary) hypertension     Mixed hyperlipidemia     Obstructive sleep apnea (adult) (pediatric)     SLEEP STUDY    Type 2 diabetes mellitus with hyperglycemia     Vitamin D deficiency      No Known Allergies            Result Review :     Common labs          3/23/2023    07:21 7/5/2023    07:15 9/28/2023    16:23   Common Labs   Glucose 124  132     BUN 15  14     Creatinine 0.72  0.71     Sodium 137  141     Potassium 4.2  4.4     Chloride 101  107     Calcium 9.1  9.3     Albumin 4.5  4.3     Total Bilirubin 0.6  0.3     Alkaline Phosphatase 74  66     AST (SGOT) 30  22     ALT (SGPT) 24  22     Total Cholesterol 151  135     Triglycerides 56  32     HDL Cholesterol 70  62     LDL Cholesterol  69  64     Hemoglobin A1C 5.80  6.00  6.3    PSA  0.272          No Images in the past 120 days found..           Social History     Tobacco Use   Smoking Status Former    Packs/day: 1.00    Years: 8.00    Pack years: 8.00    Types: Cigarettes    Start date: 1988    Quit date: 1996    Years since quitting:  27.7   Smokeless Tobacco Never           Assessment and Plan    Diagnoses and all orders for this visit:    1. Type 2 diabetes mellitus without complication, without long-term current use of insulin (Primary)  Assessment & Plan:  Diabetes is stable with hemoglobin A1c of 6.3.  Continue current treatment and follow-up in 6 months or sooner if concerns.  Patient will have labs done prior to next visit.    Orders:  -     POCT Glucose  -     POC Glycosylated Hemoglobin (Hb A1C)  -     metFORMIN (GLUCOPHAGE) 1000 MG tablet; Take 1 tablet by mouth 2 (Two) Times a Day.  Dispense: 180 tablet; Refill: 1  -     Comprehensive metabolic panel; Future  -     Hemoglobin A1c; Future  -     Lipid panel; Future  -     Microalbumin / Creatinine Urine Ratio - Urine, Clean Catch; Future    2. Mixed hyperlipidemia  -     simvastatin (ZOCOR) 20 MG tablet; Take 1 tablet by mouth every night at bedtime.  Dispense: 90 tablet; Refill: 1  -     Lipid panel; Future        Follow Up    No follow-ups on file.  Patient was given instructions and counseling regarding his condition or for health maintenance advice. Please see specific information pulled into the AVS if appropriate.

## 2023-10-09 ENCOUNTER — OFFICE VISIT (OUTPATIENT)
Dept: FAMILY MEDICINE CLINIC | Age: 53
End: 2023-10-09
Payer: COMMERCIAL

## 2023-10-09 VITALS
SYSTOLIC BLOOD PRESSURE: 139 MMHG | HEART RATE: 73 BPM | WEIGHT: 210.4 LBS | DIASTOLIC BLOOD PRESSURE: 83 MMHG | BODY MASS INDEX: 29.46 KG/M2 | HEIGHT: 71 IN

## 2023-10-09 DIAGNOSIS — L02.91 ABSCESS: Primary | ICD-10-CM

## 2023-10-09 PROCEDURE — 87070 CULTURE OTHR SPECIMN AEROBIC: CPT | Performed by: PHYSICIAN ASSISTANT

## 2023-10-09 PROCEDURE — 87205 SMEAR GRAM STAIN: CPT | Performed by: PHYSICIAN ASSISTANT

## 2023-10-09 NOTE — PROGRESS NOTES
"  Diagnoses and all orders for this visit:    1. Abscess (Primary)  Comments:  Little to no cellulitis on exam.  He was instructed to contact the office if the I&D does not resolve his symptoms.  Wound culture pending.  Orders:  -     Wound Culture - Drainage, Back; Future  -     Wound Culture - Drainage, Back  -     Incision & Drainage            Subjective     CHIEF COMPLAINT    Chief Complaint   Patient presents with    Abscess     On back              History of Present Illness  This is a 53-year-old male presenting to the clinic with concerns for an infected cyst on his back.  He states the cyst has been there \"his entire life\" but it began becoming painful and swelling in the last week.  He has been trying to \"squeeze it\" at home and states he thinks something might have drained out of it but he is not sure.  It feels warm to the touch and he has noticed some generalized aching pains in his right shoulder around where the cyst is.  Yesterday he reports that he \"felt bad\" but denies any fever, chills, myalgias, nausea or vomiting.            Review of Systems   Constitutional:  Negative for chills and fever.   Gastrointestinal:  Negative for nausea and vomiting.   Musculoskeletal:  Negative for myalgias.   Skin:  Positive for color change.            Past Medical History:   Diagnosis Date    Abnormal results of liver function studies     COVID-19 10/2020    Enlarged aorta     Essential (primary) hypertension     Mixed hyperlipidemia     Obstructive sleep apnea (adult) (pediatric)     SLEEP STUDY    Type 2 diabetes mellitus with hyperglycemia     Vitamin D deficiency             Past Surgical History:   Procedure Laterality Date    FOOT FRACTURE SURGERY Left 08/07/1999    ROTATOR CUFF REPAIR  2006            Family History   Problem Relation Age of Onset    Diabetes type II Mother     Hypertension Father             Social History     Socioeconomic History    Marital status:     Number of children: 1 " "  Tobacco Use    Smoking status: Former     Packs/day: 1.00     Years: 8.00     Additional pack years: 0.00     Total pack years: 8.00     Types: Cigarettes     Start date:      Quit date:      Years since quittin.7    Smokeless tobacco: Never   Vaping Use    Vaping Use: Never used   Substance and Sexual Activity    Alcohol use: Yes     Comment: occassionally     Drug use: Yes     Types: Marijuana    Sexual activity: Defer            No Known Allergies         Current Outpatient Medications on File Prior to Visit   Medication Sig Dispense Refill    albuterol sulfate  (90 Base) MCG/ACT inhaler Inhale 2 puffs Every 4 (Four) Hours As Needed for Wheezing or Shortness of Air. 18 g 0    amLODIPine (NORVASC) 5 MG tablet Take 1 tablet by mouth Daily.      lisinopril (PRINIVIL,ZESTRIL) 40 MG tablet Take 1 tablet by mouth Daily.      metFORMIN (GLUCOPHAGE) 1000 MG tablet Take 1 tablet by mouth 2 (Two) Times a Day. 180 tablet 1    multivitamin with minerals tablet tablet Take 1 tablet by mouth Daily.      simvastatin (ZOCOR) 20 MG tablet Take 1 tablet by mouth every night at bedtime. 90 tablet 1     No current facility-administered medications on file prior to visit.            /83 (BP Location: Left arm, Patient Position: Sitting)   Pulse 73   Ht 180.3 cm (71\")   Wt 95.4 kg (210 lb 6.4 oz)   BMI 29.34 kg/mý          Objective     Physical Exam  Vitals and nursing note reviewed.   Constitutional:       General: He is not in acute distress.     Appearance: Normal appearance.   Pulmonary:      Effort: Pulmonary effort is normal. No respiratory distress.   Skin:     General: Skin is warm and dry.      Findings: Erythema present.          Neurological:      Mental Status: He is alert and oriented to person, place, and time.   Psychiatric:         Mood and Affect: Mood normal.         Behavior: Behavior normal.              Incision & Drainage    Date/Time: 10/9/2023 11:58 AM    Performed by: Pamela" Fatoumata Gillespie PA-C  Authorized by: Fatoumata Santiago PA-C  Type: abscess  Body area: trunk  Location details: back  Anesthesia: local infiltration    Anesthesia:  Local Anesthetic: lidocaine 1% with epinephrine  Anesthetic total: 2 mL    Sedation:  Patient sedated: no    Scalpel size: 11  Incision type: single straight  Drainage: purulent and bloody  Drainage amount: moderate  Wound treatment: wound left open  Patient tolerance: patient tolerated the procedure well with no immediate complications                          Lab Results (last 24 hours)       Procedure Component Value Units Date/Time    Wound Culture - Drainage, Back [[id number]] Collected: 10/09/23 1150    Specimen: Drainage from Back Updated: 10/09/23 1153                        Diagnoses and all orders for this visit:    1. Abscess (Primary)  Comments:  Little to no cellulitis on exam.  He was instructed to contact the office if the I&D does not resolve his symptoms.  Wound culture pending.  Orders:  -     Wound Culture - Drainage, Back; Future  -     Wound Culture - Drainage, Back  -     Incision & Drainage             Additional Instructions for the Follow-ups that You Need to Schedule       Wound Culture - Drainage, Back    Oct 09, 2023 (Approximate)      Release to patient: Routine Release                          FOR FULL DISCHARGE INSTRUCTIONS/COMMENTS/HANDOUTS please see the   AVS

## 2023-10-12 LAB
BACTERIA SPEC AEROBE CULT: NORMAL
GRAM STN SPEC: NORMAL
GRAM STN SPEC: NORMAL

## 2024-03-27 DIAGNOSIS — E78.2 MIXED HYPERLIPIDEMIA: ICD-10-CM

## 2024-03-27 DIAGNOSIS — E11.9 TYPE 2 DIABETES MELLITUS WITHOUT COMPLICATION, WITHOUT LONG-TERM CURRENT USE OF INSULIN: ICD-10-CM

## 2024-03-27 RX ORDER — SIMVASTATIN 20 MG
20 TABLET ORAL
Qty: 30 TABLET | Refills: 0 | Status: SHIPPED | OUTPATIENT
Start: 2024-03-27 | End: 2024-03-29 | Stop reason: SDUPTHER

## 2024-03-29 ENCOUNTER — LAB (OUTPATIENT)
Dept: LAB | Facility: HOSPITAL | Age: 54
End: 2024-03-29
Payer: COMMERCIAL

## 2024-03-29 ENCOUNTER — OFFICE VISIT (OUTPATIENT)
Dept: FAMILY MEDICINE CLINIC | Age: 54
End: 2024-03-29
Payer: COMMERCIAL

## 2024-03-29 VITALS
HEART RATE: 70 BPM | WEIGHT: 214 LBS | DIASTOLIC BLOOD PRESSURE: 72 MMHG | HEIGHT: 71 IN | BODY MASS INDEX: 29.96 KG/M2 | SYSTOLIC BLOOD PRESSURE: 113 MMHG | OXYGEN SATURATION: 95 %

## 2024-03-29 DIAGNOSIS — E11.9 TYPE 2 DIABETES MELLITUS WITHOUT COMPLICATION, WITHOUT LONG-TERM CURRENT USE OF INSULIN: Primary | ICD-10-CM

## 2024-03-29 DIAGNOSIS — I10 ESSENTIAL HYPERTENSION: ICD-10-CM

## 2024-03-29 DIAGNOSIS — E11.9 TYPE 2 DIABETES MELLITUS WITHOUT COMPLICATION, WITHOUT LONG-TERM CURRENT USE OF INSULIN: ICD-10-CM

## 2024-03-29 DIAGNOSIS — Z12.11 COLON CANCER SCREENING: ICD-10-CM

## 2024-03-29 DIAGNOSIS — E78.2 MIXED HYPERLIPIDEMIA: ICD-10-CM

## 2024-03-29 PROBLEM — Z12.5 PROSTATE CANCER SCREENING: Status: ACTIVE | Noted: 2024-03-29

## 2024-03-29 LAB
ALBUMIN SERPL-MCNC: 4.7 G/DL (ref 3.5–5.2)
ALBUMIN UR-MCNC: 1.4 MG/DL
ALBUMIN/GLOB SERPL: 2.2 G/DL
ALP SERPL-CCNC: 84 U/L (ref 39–117)
ALT SERPL W P-5'-P-CCNC: 22 U/L (ref 1–41)
ANION GAP SERPL CALCULATED.3IONS-SCNC: 9 MMOL/L (ref 5–15)
AST SERPL-CCNC: 22 U/L (ref 1–40)
BILIRUB SERPL-MCNC: 0.9 MG/DL (ref 0–1.2)
BUN SERPL-MCNC: 13 MG/DL (ref 6–20)
BUN/CREAT SERPL: 14.4 (ref 7–25)
CALCIUM SPEC-SCNC: 9.4 MG/DL (ref 8.6–10.5)
CHLORIDE SERPL-SCNC: 102 MMOL/L (ref 98–107)
CHOLEST SERPL-MCNC: 146 MG/DL (ref 0–200)
CO2 SERPL-SCNC: 27 MMOL/L (ref 22–29)
CREAT SERPL-MCNC: 0.9 MG/DL (ref 0.76–1.27)
CREAT UR-MCNC: 34.7 MG/DL
EGFRCR SERPLBLD CKD-EPI 2021: 103.4 ML/MIN/1.73
GLOBULIN UR ELPH-MCNC: 2.1 GM/DL
GLUCOSE SERPL-MCNC: 95 MG/DL (ref 65–99)
HBA1C MFR BLD: 6.7 % (ref 4.8–5.6)
HDLC SERPL-MCNC: 68 MG/DL (ref 40–60)
LDLC SERPL CALC-MCNC: 63 MG/DL (ref 0–100)
LDLC/HDLC SERPL: 0.92 {RATIO}
MICROALBUMIN/CREAT UR: 40.3 MG/G (ref 0–29)
POTASSIUM SERPL-SCNC: 4.2 MMOL/L (ref 3.5–5.2)
PROT SERPL-MCNC: 6.8 G/DL (ref 6–8.5)
SODIUM SERPL-SCNC: 138 MMOL/L (ref 136–145)
TRIGL SERPL-MCNC: 76 MG/DL (ref 0–150)
VLDLC SERPL-MCNC: 15 MG/DL (ref 5–40)

## 2024-03-29 PROCEDURE — 83036 HEMOGLOBIN GLYCOSYLATED A1C: CPT

## 2024-03-29 PROCEDURE — 80061 LIPID PANEL: CPT

## 2024-03-29 PROCEDURE — 82043 UR ALBUMIN QUANTITATIVE: CPT

## 2024-03-29 PROCEDURE — 80053 COMPREHEN METABOLIC PANEL: CPT

## 2024-03-29 PROCEDURE — 82570 ASSAY OF URINE CREATININE: CPT

## 2024-03-29 PROCEDURE — 36415 COLL VENOUS BLD VENIPUNCTURE: CPT

## 2024-03-29 NOTE — PROGRESS NOTES
"Chief Complaint  Diabetes (6 month follow up - due for colonoscopy ) and Hyperlipidemia    Subjective          Moe Page presents to [facility]        Patient is a 53 year old male here today to follow up regarding type 2 diabetes.  Last hgb A1c was 6.3.  current treatment is metformin 1000 mg bid.  Denies side effects and requests refills.    Regarding hyperlipidemia, he is taking simvastatin 20 mg at bedtime.  Denies side effects and requests refills.    Cardiology manages hypertension.    Objective   Vital Signs:   Vitals:    03/29/24 1529   BP: 113/72   BP Location: Left arm   Patient Position: Sitting   Cuff Size: Adult   Pulse: 70   SpO2: 95%   Weight: 97.1 kg (214 lb)   Height: 180.3 cm (71\")       Wt Readings from Last 3 Encounters:   03/29/24 97.1 kg (214 lb)   10/09/23 95.4 kg (210 lb 6.4 oz)   09/28/23 96.2 kg (212 lb)      BP Readings from Last 3 Encounters:   03/29/24 113/72   10/09/23 139/83   09/28/23 110/72       Body mass index is 29.85 kg/m².             Physical Exam  Constitutional:       General: He is not in acute distress.     Appearance: Normal appearance. He is not ill-appearing.   Neck:      Thyroid: No thyromegaly.   Cardiovascular:      Rate and Rhythm: Normal rate and regular rhythm.      Pulses: Normal pulses.      Heart sounds: Normal heart sounds.   Musculoskeletal:         General: Normal range of motion.   Skin:     General: Skin is warm and dry.   Neurological:      General: No focal deficit present.      Mental Status: He is alert.   Psychiatric:         Mood and Affect: Mood normal.         Behavior: Behavior normal.         Thought Content: Thought content normal.         Judgment: Judgment normal.           Current Outpatient Medications:     albuterol sulfate  (90 Base) MCG/ACT inhaler, Inhale 2 puffs Every 4 (Four) Hours As Needed for Wheezing or Shortness of Air., Disp: 18 g, Rfl: 0    amLODIPine (NORVASC) 5 MG tablet, Take 1 " tablet by mouth Daily., Disp: , Rfl:     lisinopril (PRINIVIL,ZESTRIL) 40 MG tablet, Take 1 tablet by mouth Daily., Disp: , Rfl:     metFORMIN (GLUCOPHAGE) 1000 MG tablet, Take 1 tablet by mouth 2 (Two) Times a Day., Disp: 180 tablet, Rfl: 1    multivitamin with minerals tablet tablet, Take 1 tablet by mouth Daily., Disp: , Rfl:     simvastatin (ZOCOR) 20 MG tablet, Take 1 tablet by mouth every night at bedtime., Disp: 90 tablet, Rfl: 1   Past Medical History:   Diagnosis Date    Abnormal results of liver function studies     COVID-19 10/2020    Enlarged aorta     Essential (primary) hypertension     Mixed hyperlipidemia     Obstructive sleep apnea (adult) (pediatric)     SLEEP STUDY    Type 2 diabetes mellitus with hyperglycemia     Vitamin D deficiency      No Known Allergies            Result Review :     Common labs          2023    07:15 2023    16:23 3/29/2024    16:33 3/29/2024    16:35   Common Labs   Glucose 132   95     BUN 14   13     Creatinine 0.71   0.90     Sodium 141   138     Potassium 4.4   4.2     Chloride 107   102     Calcium 9.3   9.4     Albumin 4.3   4.7     Total Bilirubin 0.3   0.9     Alkaline Phosphatase 66   84     AST (SGOT) 22   22     ALT (SGPT) 22   22     Total Cholesterol 135   146     Triglycerides 32   76     HDL Cholesterol 62   68     LDL Cholesterol  64   63     Hemoglobin A1C 6.00  6.3  6.70     Microalbumin, Urine    1.4    PSA 0.272            No Images in the past 120 days found..           Social History     Tobacco Use   Smoking Status Former    Current packs/day: 0.00    Average packs/day: 1 pack/day for 8.0 years (8.0 ttl pk-yrs)    Types: Cigarettes    Start date: 1988    Quit date:     Years since quittin.2   Smokeless Tobacco Never           Assessment and Plan    Diagnoses and all orders for this visit:    1. Type 2 diabetes mellitus without complication, without long-term current use of insulin (Primary)  Assessment & Plan:  Monitor blood  pressure and blood sugar and report any elevated readings.  Further treatment recommendations pending lab results.  Follow-up every 6 months if hemoglobin A1c is 7 or below.  If hemoglobin A1c is higher than 7 we will follow-up every 3 months.    Orders:  -     Comprehensive metabolic panel; Future  -     Hemoglobin A1c; Future  -     Lipid panel; Future  -     metFORMIN (GLUCOPHAGE) 1000 MG tablet; Take 1 tablet by mouth 2 (Two) Times a Day.  Dispense: 180 tablet; Refill: 1    2. Mixed hyperlipidemia  -     Lipid panel; Future  -     simvastatin (ZOCOR) 20 MG tablet; Take 1 tablet by mouth every night at bedtime.  Dispense: 90 tablet; Refill: 1    3. Essential hypertension  -     Comprehensive metabolic panel; Future  -     Lipid panel; Future    4. Colon cancer screening  Assessment & Plan:  Patient to let me know when he wishes to pursue colon cancer screening.          Follow Up    No follow-ups on file.  Patient was given instructions and counseling regarding his condition or for health maintenance advice. Please see specific information pulled into the AVS if appropriate.

## 2024-03-31 RX ORDER — SIMVASTATIN 20 MG
20 TABLET ORAL
Qty: 90 TABLET | Refills: 1 | Status: SHIPPED | OUTPATIENT
Start: 2024-03-31

## 2024-04-01 NOTE — PROGRESS NOTES
Hemoglobin A1c is little higher but diabetes is still considered controlled if your A1c is less than 7.  Blood sugar, kidney, and liver function are normal.  Lipid panel looks good.

## 2024-04-02 ENCOUNTER — TELEPHONE (OUTPATIENT)
Dept: FAMILY MEDICINE CLINIC | Age: 54
End: 2024-04-02
Payer: COMMERCIAL

## 2024-04-02 NOTE — TELEPHONE ENCOUNTER
----- Message from LUPILLO Dudley sent at 4/2/2024 12:51 PM EDT -----  Please call and clarify where patient would like to be referred for colon cancer screening per colonoscopy.

## 2024-04-09 NOTE — ASSESSMENT & PLAN NOTE
Monitor blood pressure and blood sugar and report any elevated readings.  Further treatment recommendations pending lab results.  Follow-up every 6 months if hemoglobin A1c is 7 or below.  If hemoglobin A1c is higher than 7 we will follow-up every 3 months.

## 2024-04-14 DIAGNOSIS — Z12.11 COLON CANCER SCREENING: Primary | ICD-10-CM

## 2024-04-29 ENCOUNTER — OFFICE VISIT (OUTPATIENT)
Dept: FAMILY MEDICINE CLINIC | Age: 54
End: 2024-04-29
Payer: COMMERCIAL

## 2024-04-29 VITALS
HEIGHT: 71 IN | HEART RATE: 75 BPM | DIASTOLIC BLOOD PRESSURE: 89 MMHG | SYSTOLIC BLOOD PRESSURE: 133 MMHG | TEMPERATURE: 98.4 F | WEIGHT: 211 LBS | BODY MASS INDEX: 29.54 KG/M2 | OXYGEN SATURATION: 96 %

## 2024-04-29 DIAGNOSIS — U07.1 COVID-19: Primary | ICD-10-CM

## 2024-04-29 DIAGNOSIS — J02.9 SORE THROAT: ICD-10-CM

## 2024-04-29 LAB
EXPIRATION DATE: NORMAL
INTERNAL CONTROL: NORMAL
Lab: NORMAL
S PYO AG THROAT QL: NEGATIVE

## 2024-04-29 PROCEDURE — 99213 OFFICE O/P EST LOW 20 MIN: CPT

## 2024-04-29 PROCEDURE — 87880 STREP A ASSAY W/OPTIC: CPT

## 2024-04-29 PROCEDURE — 87081 CULTURE SCREEN ONLY: CPT

## 2024-04-29 RX ORDER — GUAIFENESIN 600 MG/1
1200 TABLET, EXTENDED RELEASE ORAL 2 TIMES DAILY PRN
Qty: 28 TABLET | Refills: 0 | Status: SHIPPED | OUTPATIENT
Start: 2024-04-29

## 2024-04-29 RX ORDER — ALBUTEROL SULFATE 90 UG/1
2 AEROSOL, METERED RESPIRATORY (INHALATION) EVERY 4 HOURS PRN
Qty: 6.7 G | Refills: 0 | Status: SHIPPED | OUTPATIENT
Start: 2024-04-29

## 2024-04-29 RX ORDER — DEXTROMETHORPHAN HYDROBROMIDE AND PROMETHAZINE HYDROCHLORIDE 15; 6.25 MG/5ML; MG/5ML
5 SYRUP ORAL NIGHTLY PRN
Qty: 120 ML | Refills: 0 | Status: SHIPPED | OUTPATIENT
Start: 2024-04-29

## 2024-04-29 NOTE — PROGRESS NOTES
"Subjective     CHIEF COMPLAINT    Chief Complaint   Patient presents with    Generalized Body Aches     Positive covid test at home on 4/27, patient stated symptoms started 4/26     Chills    Nasal Congestion    Cough    Sore Throat    Shortness of Breath     History of Present Illness  Patient is a 53-year-old male, presents to the clinic today with complaints of cough, congestion, chills and fatigue.  He is also had some body aches and headaches as well as a sore throat.  Symptoms began on 4-.  He took a COVID test at home on Saturday which was positive.  He repeated a test today and it was still positive.  He is taking NyQuil and generic cold and flu medications.  Denies any fever.  Denies any chest pain.  He has had some mild shortness of breath.  He is prescribed an inhaler for suspected asthma but he states that he cannot find it.  His cough is productive.  No known exposure to any illnesses.    Review of Systems   Constitutional:  Positive for chills and fatigue. Negative for fever.   HENT:  Positive for congestion, ear pain (\"muffled\") and sore throat. Negative for rhinorrhea.    Respiratory:  Positive for cough and shortness of breath. Negative for wheezing.    Cardiovascular:  Negative for chest pain.   Gastrointestinal:  Negative for nausea and vomiting.   Musculoskeletal:  Positive for myalgias.   Neurological:  Positive for headaches.       Past Medical History:   Diagnosis Date    Abnormal results of liver function studies     COVID-19 10/2020    Enlarged aorta     Essential (primary) hypertension     Mixed hyperlipidemia     Obstructive sleep apnea (adult) (pediatric)     SLEEP STUDY    Type 2 diabetes mellitus with hyperglycemia     Vitamin D deficiency      No Known Allergies    Current Outpatient Medications on File Prior to Visit   Medication Sig Dispense Refill    amLODIPine (NORVASC) 5 MG tablet Take 1 tablet by mouth Daily.      lisinopril (PRINIVIL,ZESTRIL) 40 MG tablet Take 1 tablet by " "mouth Daily.      metFORMIN (GLUCOPHAGE) 1000 MG tablet Take 1 tablet by mouth 2 (Two) Times a Day. 180 tablet 1    multivitamin with minerals tablet tablet Take 1 tablet by mouth Daily.      simvastatin (ZOCOR) 20 MG tablet Take 1 tablet by mouth every night at bedtime. 90 tablet 1    [DISCONTINUED] albuterol sulfate  (90 Base) MCG/ACT inhaler Inhale 2 puffs Every 4 (Four) Hours As Needed for Wheezing or Shortness of Air. 18 g 0     No current facility-administered medications on file prior to visit.     /89 (BP Location: Left arm, Patient Position: Sitting, Cuff Size: Adult)   Pulse 75   Temp 98.4 °F (36.9 °C) (Oral)   Ht 180.3 cm (71\")   Wt 95.7 kg (211 lb)   SpO2 96%   BMI 29.43 kg/m²     Objective     Physical Exam  Vitals and nursing note reviewed.   Constitutional:       General: He is not in acute distress.     Appearance: Normal appearance. He is not ill-appearing.   HENT:      Head: Normocephalic.      Right Ear: Tympanic membrane, ear canal and external ear normal.      Left Ear: Tympanic membrane, ear canal and external ear normal.      Nose: Nose normal.      Right Sinus: No maxillary sinus tenderness or frontal sinus tenderness.      Left Sinus: No maxillary sinus tenderness or frontal sinus tenderness.      Mouth/Throat:      Lips: Pink.      Mouth: Mucous membranes are moist.      Pharynx: Oropharynx is clear. Uvula midline. Posterior oropharyngeal erythema present. No pharyngeal swelling, oropharyngeal exudate or uvula swelling.   Eyes:      Pupils: Pupils are equal, round, and reactive to light.   Cardiovascular:      Rate and Rhythm: Normal rate and regular rhythm.      Heart sounds: Normal heart sounds. No murmur heard.  Pulmonary:      Effort: Pulmonary effort is normal. No accessory muscle usage or respiratory distress.      Breath sounds: Normal breath sounds. No wheezing or rhonchi.   Musculoskeletal:      Cervical back: Normal range of motion.   Lymphadenopathy:      " Cervical: No cervical adenopathy.   Skin:     General: Skin is warm and dry.   Neurological:      General: No focal deficit present.      Mental Status: He is alert and oriented to person, place, and time.   Psychiatric:         Mood and Affect: Mood and affect normal.         Behavior: Behavior normal.          Lab Results (last 24 hours)       Procedure Component Value Units Date/Time    POCT rapid strep A [[id number]] Collected: 04/29/24 1009    Specimen: Swab Updated: 04/29/24 1010     Rapid Strep A Screen Negative     Internal Control Passed     Lot Number [id number]     Expiration Date 5/31/25    Beta Strep Culture, Throat - Swab, Throat [[id number]] Collected: 04/29/24 1010    Specimen: Swab from Throat Updated: 04/29/24 1117            The following data was reviewed for today's visit:  Comprehensive metabolic panel (03/29/2024 16:33)       Diagnoses and all orders for this visit:    1. COVID-19 (Primary)  -     promethazine-dextromethorphan (PROMETHAZINE-DM) 6.25-15 MG/5ML syrup; Take 5 mL by mouth At Night As Needed for Cough.  Dispense: 120 mL; Refill: 0  -     guaiFENesin (Mucinex) 600 MG 12 hr tablet; Take 2 tablets by mouth 2 (Two) Times a Day As Needed for Cough or Congestion.  Dispense: 28 tablet; Refill: 0    2. Sore throat  Comments:  Negative rapid strep, culture sent and pending.  Will treat accordingly.  Orders:  -     POCT rapid strep A  -     Beta Strep Culture, Throat - , Throat; Future  -     Beta Strep Culture, Throat - Swab, Throat    Other orders  -     albuterol sulfate  (90 Base) MCG/ACT inhaler; Inhale 2 puffs Every 4 (Four) Hours As Needed for Wheezing or Shortness of Air.  Dispense: 6.7 g; Refill: 0      Patient had a positive COVID test at home.  Unfortunately, today is day 6 of symptoms so he would be outside the window for Paxlovid.  His exam and history today is not concerning.  I will prescribe him some Mucinex to take during the day and Promethazine DM for nighttime.  He  should increase fluid intake and rest.  I have also refilled his albuterol inhaler for him as he cannot find his at home.  He was educated on strict return ER precautions and he voiced understanding.  He was also educated on isolation guidelines per CDC.     Follow up:  Return if symptoms worsen or fail to improve.  Patient was given instructions and counseling regarding his condition or for health maintenance advice. Please see specific information pulled into the AVS if appropriate.

## 2024-05-01 LAB — BACTERIA SPEC AEROBE CULT: NORMAL

## 2024-10-08 ENCOUNTER — LAB (OUTPATIENT)
Dept: LAB | Facility: HOSPITAL | Age: 54
End: 2024-10-08
Payer: COMMERCIAL

## 2024-10-08 ENCOUNTER — OFFICE VISIT (OUTPATIENT)
Dept: FAMILY MEDICINE CLINIC | Age: 54
End: 2024-10-08
Payer: COMMERCIAL

## 2024-10-08 VITALS
HEIGHT: 71 IN | DIASTOLIC BLOOD PRESSURE: 84 MMHG | HEART RATE: 58 BPM | TEMPERATURE: 97.2 F | OXYGEN SATURATION: 98 % | WEIGHT: 214 LBS | SYSTOLIC BLOOD PRESSURE: 136 MMHG | BODY MASS INDEX: 29.96 KG/M2

## 2024-10-08 DIAGNOSIS — B35.1 ONYCHOMYCOSIS: ICD-10-CM

## 2024-10-08 DIAGNOSIS — Z11.59 SCREENING FOR VIRAL DISEASE: ICD-10-CM

## 2024-10-08 DIAGNOSIS — Z12.5 PROSTATE CANCER SCREENING: ICD-10-CM

## 2024-10-08 DIAGNOSIS — E11.9 TYPE 2 DIABETES MELLITUS WITHOUT COMPLICATION, WITHOUT LONG-TERM CURRENT USE OF INSULIN: Primary | ICD-10-CM

## 2024-10-08 DIAGNOSIS — E11.9 TYPE 2 DIABETES MELLITUS WITHOUT COMPLICATION, WITHOUT LONG-TERM CURRENT USE OF INSULIN: ICD-10-CM

## 2024-10-08 LAB
ALBUMIN SERPL-MCNC: 4.4 G/DL (ref 3.5–5.2)
ALBUMIN/GLOB SERPL: 1.7 G/DL
ALP SERPL-CCNC: 75 U/L (ref 39–117)
ALT SERPL W P-5'-P-CCNC: 21 U/L (ref 1–41)
ANION GAP SERPL CALCULATED.3IONS-SCNC: 8 MMOL/L (ref 5–15)
AST SERPL-CCNC: 26 U/L (ref 1–40)
BILIRUB SERPL-MCNC: 1.3 MG/DL (ref 0–1.2)
BUN SERPL-MCNC: 10 MG/DL (ref 6–20)
BUN/CREAT SERPL: 11.8 (ref 7–25)
CALCIUM SPEC-SCNC: 9.6 MG/DL (ref 8.6–10.5)
CHLORIDE SERPL-SCNC: 103 MMOL/L (ref 98–107)
CHOLEST SERPL-MCNC: 186 MG/DL (ref 0–200)
CO2 SERPL-SCNC: 26 MMOL/L (ref 22–29)
CREAT SERPL-MCNC: 0.85 MG/DL (ref 0.76–1.27)
EGFRCR SERPLBLD CKD-EPI 2021: 104.6 ML/MIN/1.73
GLOBULIN UR ELPH-MCNC: 2.6 GM/DL
GLUCOSE SERPL-MCNC: 134 MG/DL (ref 65–99)
HBA1C MFR BLD: 6.3 % (ref 4.8–5.6)
HCV AB SER QL: NORMAL
HDLC SERPL-MCNC: 61 MG/DL (ref 40–60)
LDLC SERPL CALC-MCNC: 116 MG/DL (ref 0–100)
LDLC/HDLC SERPL: 1.89 {RATIO}
POTASSIUM SERPL-SCNC: 4.6 MMOL/L (ref 3.5–5.2)
PROT SERPL-MCNC: 7 G/DL (ref 6–8.5)
PSA SERPL-MCNC: 0.27 NG/ML (ref 0–4)
SODIUM SERPL-SCNC: 137 MMOL/L (ref 136–145)
TRIGL SERPL-MCNC: 49 MG/DL (ref 0–150)
VLDLC SERPL-MCNC: 9 MG/DL (ref 5–40)

## 2024-10-08 PROCEDURE — 86803 HEPATITIS C AB TEST: CPT

## 2024-10-08 PROCEDURE — 36415 COLL VENOUS BLD VENIPUNCTURE: CPT

## 2024-10-08 PROCEDURE — 83036 HEMOGLOBIN GLYCOSYLATED A1C: CPT

## 2024-10-08 PROCEDURE — 99214 OFFICE O/P EST MOD 30 MIN: CPT | Performed by: NURSE PRACTITIONER

## 2024-10-08 PROCEDURE — G0103 PSA SCREENING: HCPCS

## 2024-10-08 PROCEDURE — 80053 COMPREHEN METABOLIC PANEL: CPT

## 2024-10-08 PROCEDURE — 80061 LIPID PANEL: CPT

## 2024-10-08 NOTE — ASSESSMENT & PLAN NOTE
Further treatment recommendations pending lab results.  Will consider if decreasing doses of metformin or simvastatin are appropriate based on current lab results.

## 2024-10-08 NOTE — PROGRESS NOTES
"Chief Complaint  Diabetes (6 month follow up ), Hypertension, and Hyperlipidemia    Subjective          Moe Page presents to [facility]     Patient is a 54-year-old male here today to follow-up regarding diabetes.  His wife has been in and out of the hospital and he has only been taking metformin 1000 mg twice per day about 40 to 50% of the time.  He does check his blood sugar at home and random readings have anywhere from  around 5 PM.  Glucose was 190 yesterday about 90 minutes after eating.  He has also been taking his simvastatin 20 mg at bedtime about 40 to 50% of the time.  Cardiology prescribes his hypertension medications which include lisinopril and amlodipine.  Blood pressures under good control.  Patient would like to decrease some of his meds taken and he is working toward further lifestyle changes that can more positively impact his blood sugar and cholesterol levels.  His hemoglobin A1c was 6.7 in March.    Patient has upcoming colon cancer screening consult scheduled in February.    Patient is aware that he has toenail fungus on 2 toenails.  He has used over-the-counter treatments for more than 12 months for multiple courses without resolution.  He does frequently get his feet wet any areas out his feet when he is able.     Objective   Vital Signs:   Vitals:    10/08/24 0838   BP: 136/84   BP Location: Left arm   Patient Position: Sitting   Cuff Size: Adult   Pulse: 58   Temp: 97.2 °F (36.2 °C)   TempSrc: Temporal   SpO2: 98%   Weight: 97.1 kg (214 lb)   Height: 180.3 cm (71\")       Wt Readings from Last 3 Encounters:   10/08/24 97.1 kg (214 lb)   04/29/24 95.7 kg (211 lb)   03/29/24 97.1 kg (214 lb)      BP Readings from Last 3 Encounters:   10/08/24 136/84   04/29/24 133/89   03/29/24 113/72       Body mass index is 29.85 kg/m².             Physical Exam  Vitals reviewed.   Constitutional:       General: He is not in acute distress.     Appearance: " Normal appearance. He is well-developed.   Neck:      Thyroid: No thyromegaly.      Vascular: No carotid bruit.   Cardiovascular:      Rate and Rhythm: Normal rate and regular rhythm.      Pulses:           Dorsalis pedis pulses are 2+ on the right side and 2+ on the left side.        Posterior tibial pulses are 2+ on the right side and 2+ on the left side.      Heart sounds: Normal heart sounds.   Pulmonary:      Effort: Pulmonary effort is normal.      Breath sounds: Normal breath sounds.   Musculoskeletal:      Right lower leg: No edema.      Left lower leg: No edema.   Feet:      Right foot:      Protective Sensation: 3 sites tested.  3 sites sensed.      Toenail Condition: Fungal disease present.     Left foot:      Protective Sensation: 3 sites tested.  3 sites sensed.      Toenail Condition: Fungal disease present.     Comments: Left second toe and right first toe with fungal disease  Skin:     General: Skin is warm and dry.   Neurological:      General: No focal deficit present.      Mental Status: He is alert.   Psychiatric:         Attention and Perception: Attention normal.         Mood and Affect: Mood and affect normal.         Behavior: Behavior normal.           Current Outpatient Medications:     albuterol sulfate  (90 Base) MCG/ACT inhaler, Inhale 2 puffs Every 4 (Four) Hours As Needed for Wheezing or Shortness of Air., Disp: 6.7 g, Rfl: 0    amLODIPine (NORVASC) 5 MG tablet, Take 1 tablet by mouth daily, Disp: 90 tablet, Rfl: 1    lisinopril (PRINIVIL,ZESTRIL) 40 MG tablet, Take 1 tablet by mouth daily, Disp: 90 tablet, Rfl: 1    metFORMIN (GLUCOPHAGE) 1000 MG tablet, Take 1 tablet by mouth 2 (Two) Times a Day., Disp: 180 tablet, Rfl: 1    multivitamin with minerals tablet tablet, Take 1 tablet by mouth Daily., Disp: , Rfl:     simvastatin (ZOCOR) 20 MG tablet, Take 1 tablet by mouth every night at bedtime., Disp: 90 tablet, Rfl: 1   Past Medical History:   Diagnosis Date    Abnormal results  of liver function studies     COVID-19 10/2020    Enlarged aorta     Essential (primary) hypertension     Mixed hyperlipidemia     Obstructive sleep apnea (adult) (pediatric)     SLEEP STUDY    Type 2 diabetes mellitus with hyperglycemia     Vitamin D deficiency      No Known Allergies            Result Review :     Common labs          3/29/2024    16:33 3/29/2024    16:35   Common Labs   Glucose 95     BUN 13     Creatinine 0.90     Sodium 138     Potassium 4.2     Chloride 102     Calcium 9.4     Albumin 4.7     Total Bilirubin 0.9     Alkaline Phosphatase 84     AST (SGOT) 22     ALT (SGPT) 22     Total Cholesterol 146     Triglycerides 76     HDL Cholesterol 68     LDL Cholesterol  63     Hemoglobin A1C 6.70     Microalbumin, Urine  1.4         No Images in the past 120 days found..           Social History     Tobacco Use   Smoking Status Former    Current packs/day: 0.00    Average packs/day: 1 pack/day for 8.0 years (8.0 ttl pk-yrs)    Types: Cigarettes    Start date: 1988    Quit date:     Years since quittin.7   Smokeless Tobacco Never           Assessment and Plan    Diagnoses and all orders for this visit:    1. Type 2 diabetes mellitus without complication, without long-term current use of insulin (Primary)  Assessment & Plan:  Further treatment recommendations pending lab results.  Will consider if decreasing doses of metformin or simvastatin are appropriate based on current lab results.    Orders:  -     Comprehensive metabolic panel; Future  -     Hemoglobin A1c; Future  -     Lipid panel; Future    2. Prostate cancer screening  -     PSA SCREENING; Future    3. Screening for viral disease  -     Hepatitis C antibody; Future    4. Onychomycosis  Assessment & Plan:  Handout provided on terbinafine.  Liver enzyme results are pending and ordered CMP.  Consider oral terbinafine.  Patient will consider or he will follow-up with podiatrist.          Follow Up    No follow-ups on file.  Patient  was given instructions and counseling regarding his condition or for health maintenance advice. Please see specific information pulled into the AVS if appropriate.

## 2024-10-08 NOTE — ASSESSMENT & PLAN NOTE
Handout provided on terbinafine.  Liver enzyme results are pending and ordered CMP.  Consider oral terbinafine.  Patient will consider or he will follow-up with podiatrist.

## 2024-10-09 DIAGNOSIS — E11.9 TYPE 2 DIABETES MELLITUS WITHOUT COMPLICATION, WITHOUT LONG-TERM CURRENT USE OF INSULIN: Primary | ICD-10-CM

## 2024-10-09 DIAGNOSIS — E11.9 TYPE 2 DIABETES MELLITUS WITHOUT COMPLICATION, WITHOUT LONG-TERM CURRENT USE OF INSULIN: ICD-10-CM

## 2024-10-09 DIAGNOSIS — E78.2 MIXED HYPERLIPIDEMIA: Primary | ICD-10-CM

## 2024-10-09 RX ORDER — SIMVASTATIN 5 MG
5 TABLET ORAL NIGHTLY
Qty: 90 TABLET | Refills: 1 | Status: SHIPPED | OUTPATIENT
Start: 2024-10-09

## 2024-10-09 RX ORDER — METFORMIN HCL 500 MG
500 TABLET, EXTENDED RELEASE 24 HR ORAL
Qty: 90 TABLET | Refills: 1 | Status: SHIPPED | OUTPATIENT
Start: 2024-10-09

## 2024-10-09 NOTE — PROGRESS NOTES
Diabetes is under good control on metformin 1000 mg twice per day taken about 40 to 50% of the time since her last visit.  I recommend decreasing your dose of metformin to 500 mg once per day with a meal and can recheck your labs 1 week prior to your next appointment as follow-up.  Kidney function is normal.  ALT, AST, alkaline phosphatase, and bilirubin level tell us about liver function.  Bilirubin is very scantly elevated but other numbers are normal.  I am not concerned about that at this time.  We will continue to monitor.  It would be more concerning if some of these other numbers were elevated.  Report any nausea, vomiting, or abdominal pain if it were to occur.  LDL cholesterol did increase on simvastatin 20 mg at bedtime taken about 40 to 50% of the time.  I would suggest decreasing simvastatin to 5 mg at bedtime and reevaluate in 6 months.  Let me know if you have any concerns.

## 2025-01-08 ENCOUNTER — HOSPITAL ENCOUNTER (OUTPATIENT)
Dept: GENERAL RADIOLOGY | Facility: HOSPITAL | Age: 55
Discharge: HOME | End: 2025-01-08
Admitting: STUDENT IN AN ORGANIZED HEALTH CARE EDUCATION/TRAINING PROGRAM
Payer: COMMERCIAL

## 2025-01-08 ENCOUNTER — OFFICE VISIT (OUTPATIENT)
Dept: FAMILY MEDICINE CLINIC | Age: 55
End: 2025-01-08
Payer: COMMERCIAL

## 2025-01-08 VITALS
HEART RATE: 66 BPM | TEMPERATURE: 97.8 F | WEIGHT: 217 LBS | DIASTOLIC BLOOD PRESSURE: 84 MMHG | SYSTOLIC BLOOD PRESSURE: 129 MMHG | OXYGEN SATURATION: 99 % | BODY MASS INDEX: 30.38 KG/M2 | HEIGHT: 71 IN

## 2025-01-08 DIAGNOSIS — M25.521 RIGHT ELBOW PAIN: Primary | ICD-10-CM

## 2025-01-08 DIAGNOSIS — M25.521 RIGHT ELBOW PAIN: ICD-10-CM

## 2025-01-08 PROCEDURE — 99213 OFFICE O/P EST LOW 20 MIN: CPT | Performed by: STUDENT IN AN ORGANIZED HEALTH CARE EDUCATION/TRAINING PROGRAM

## 2025-01-08 PROCEDURE — 73070 X-RAY EXAM OF ELBOW: CPT

## 2025-01-08 RX ORDER — IBUPROFEN 800 MG/1
800 TABLET, FILM COATED ORAL EVERY 6 HOURS PRN
Qty: 40 TABLET | Refills: 0 | Status: SHIPPED | OUTPATIENT
Start: 2025-01-08

## 2025-01-08 NOTE — PROGRESS NOTES
"Chief Complaint     Elbow Injury (Fall on 1/3/25, landed on RT elbow and injured it. )    History of Present Illness     Moe Page is a 54 y.o. male who presents to [facility] with complaints of right elbow pain. Fell on 01/03/25, was holding a piece of equipment and fell on it. \"Felt like someone took a hot knife and was stabbing elbow\". Currently achy and tingling down forearm and into fingers, when he hits it it is a sharp burning pain 10/10.        Has tried tylenol and ice with mild relief.      History      Past Medical History:   Diagnosis Date    Abnormal results of liver function studies     COVID-19 10/2020    Enlarged aorta     Essential (primary) hypertension     Mixed hyperlipidemia     Obstructive sleep apnea (adult) (pediatric)     SLEEP STUDY    Type 2 diabetes mellitus with hyperglycemia     Vitamin D deficiency        Past Surgical History:   Procedure Laterality Date    FOOT FRACTURE SURGERY Left 08/07/1999    ROTATOR CUFF REPAIR  2006       Family History   Problem Relation Age of Onset    Diabetes type II Mother     Hypertension Father     Hypertension Paternal Grandmother         Current Medications        Current Outpatient Medications:     albuterol sulfate  (90 Base) MCG/ACT inhaler, Inhale 2 puffs Every 4 (Four) Hours As Needed for Wheezing or Shortness of Air., Disp: 6.7 g, Rfl: 0    amLODIPine (NORVASC) 5 MG tablet, Take 1 tablet by mouth daily, Disp: 90 tablet, Rfl: 1    lisinopril (PRINIVIL,ZESTRIL) 40 MG tablet, Take 1 tablet by mouth daily, Disp: 90 tablet, Rfl: 1    metFORMIN ER (GLUCOPHAGE-XR) 500 MG 24 hr tablet, Take 1 tablet by mouth Daily With Breakfast., Disp: 90 tablet, Rfl: 1    multivitamin with minerals tablet tablet, Take 1 tablet by mouth Daily., Disp: , Rfl:     simvastatin (ZOCOR) 5 MG tablet, Take 1 tablet by mouth Every Night., Disp: 90 tablet, Rfl: 1    ibuprofen (ADVIL,MOTRIN) 800 MG tablet, Take 1 tablet by mouth " "Every 6 (Six) Hours As Needed for Mild Pain or Moderate Pain., Disp: 40 tablet, Rfl: 0     Allergies     No Known Allergies    Social History       Social History     Social History Narrative    Not on file       Immunizations     Immunization:  Immunization History   Administered Date(s) Administered    Tdap 09/24/2018          Objective     Objective     Vital Signs:   /84 (BP Location: Right arm, Patient Position: Sitting, Cuff Size: Adult)   Pulse 66   Temp 97.8 °F (36.6 °C) (Oral)   Ht 180.3 cm (71\")   Wt 98.4 kg (217 lb)   SpO2 99%   BMI 30.27 kg/m²       Physical Exam  Vitals and nursing note reviewed.   Constitutional:       Appearance: Normal appearance. He is overweight.   HENT:      Head: Normocephalic.   Eyes:      Conjunctiva/sclera: Conjunctivae normal.      Pupils: Pupils are equal, round, and reactive to light.   Cardiovascular:      Rate and Rhythm: Normal rate and regular rhythm.      Pulses: Normal pulses.      Heart sounds: Normal heart sounds.   Pulmonary:      Effort: Pulmonary effort is normal.      Breath sounds: Normal breath sounds.   Abdominal:      General: Bowel sounds are normal.      Palpations: Abdomen is soft.   Musculoskeletal:         General: Normal range of motion.      Right elbow: Normal.      Left elbow: Swelling present. Tenderness present in medial epicondyle.      Cervical back: Normal range of motion and neck supple.      Comments: Tenderness with movement and palpation in the medial aspect of the elbow.  Dark blue and yellowing bruising present around the elbow.  The elbow is swollen, also and approximately 2-1/2 to 3 cm in diameter smooth round lesion.   Skin:     General: Skin is warm and dry.   Neurological:      General: No focal deficit present.      Mental Status: He is alert and oriented to person, place, and time.   Psychiatric:         Attention and Perception: Attention normal.         Mood and Affect: Mood and affect normal.         Behavior: " Behavior normal. Behavior is cooperative.         Results    The following data was reviewed by: LUPILLO Saleem on 01/08/25                XR Elbow 2 View Right (01/08/2025 15:43)   Assessment and Plan        Assessment and Plan       Right elbow pain    Orders:    ibuprofen (ADVIL,MOTRIN) 800 MG tablet; Take 1 tablet by mouth Every 6 (Six) Hours As Needed for Mild Pain or Moderate Pain.    XR Elbow 2 View Right; Future  Educated on the use of ice and/or heat as needed, rest, and compression.  Educated that it just may take a while for it to heal.  Depending on the results of the x-ray may order an ultrasound.       Educated on hospital precautions.      Follow Up        Follow Up   No follow-ups on file.  Patient was given instructions and counseling regarding his condition or for health maintenance advice. Please see specific information pulled into the AVS if appropriate.

## 2025-03-20 ENCOUNTER — OFFICE VISIT (OUTPATIENT)
Dept: SURGERY | Facility: CLINIC | Age: 55
End: 2025-03-20
Payer: COMMERCIAL

## 2025-03-20 ENCOUNTER — PREP FOR SURGERY (OUTPATIENT)
Dept: OTHER | Facility: HOSPITAL | Age: 55
End: 2025-03-20
Payer: COMMERCIAL

## 2025-03-20 VITALS — BODY MASS INDEX: 31.23 KG/M2 | WEIGHT: 223.11 LBS | RESPIRATION RATE: 16 BRPM | HEIGHT: 71 IN

## 2025-03-20 DIAGNOSIS — Z12.11 SCREENING FOR MALIGNANT NEOPLASM OF COLON: Primary | ICD-10-CM

## 2025-03-20 RX ORDER — SODIUM CHLORIDE 0.9 % (FLUSH) 0.9 %
10 SYRINGE (ML) INJECTION AS NEEDED
OUTPATIENT
Start: 2025-03-20

## 2025-03-20 RX ORDER — SODIUM CHLORIDE 0.9 % (FLUSH) 0.9 %
3 SYRINGE (ML) INJECTION EVERY 12 HOURS SCHEDULED
OUTPATIENT
Start: 2025-03-20

## 2025-03-20 RX ORDER — SODIUM, POTASSIUM,MAG SULFATES 17.5-3.13G
SOLUTION, RECONSTITUTED, ORAL ORAL
Qty: 354 ML | Refills: 0 | Status: SHIPPED | OUTPATIENT
Start: 2025-03-20

## 2025-03-20 RX ORDER — SODIUM CHLORIDE 9 MG/ML
40 INJECTION, SOLUTION INTRAVENOUS AS NEEDED
OUTPATIENT
Start: 2025-03-20

## 2025-03-20 NOTE — PROGRESS NOTES
Chief Complaint: Colonoscopy    Subjective      Colonoscopy consultation       History of Present Illness  Moe Page is a 54 y.o. male presents to [facility] for colonoscopy consultation.    Patient presents today on referral from Rochelle Drew for colonoscopy consultation.  Patient denies any abdominal pain, change in bowel habit, or rectal bleeding.  Denies any family history of colorectal cancer.  No previous colonoscopy.    Admits to MATHEW.  Does not use a CPAP since losing weight.    Patient does have a history of a aortic root dilation and left ventricular hypertrophy.  He is under the care of Dr. Greg Sousa.  I will get cardiac clearance prior to the procedure.    Denies taking any GLP-1 receptors.  Objective     Past Medical History:   Diagnosis Date    Abnormal results of liver function studies     COVID-19 10/2020    Enlarged aorta     Essential (primary) hypertension     Mixed hyperlipidemia     Obstructive sleep apnea (adult) (pediatric)     SLEEP STUDY    Type 2 diabetes mellitus with hyperglycemia     Vitamin D deficiency        Past Surgical History:   Procedure Laterality Date    FOOT FRACTURE SURGERY Left 08/07/1999    ROTATOR CUFF REPAIR  2006       Outpatient Medications Marked as Taking for the 3/20/25 encounter (Office Visit) with Meenakshi Barrios APRN   Medication Sig Dispense Refill    albuterol sulfate  (90 Base) MCG/ACT inhaler Inhale 2 puffs Every 4 (Four) Hours As Needed for Wheezing or Shortness of Air. 6.7 g 0    amLODIPine (NORVASC) 5 MG tablet Take 1 tablet by mouth daily 90 tablet 1    ibuprofen (ADVIL,MOTRIN) 800 MG tablet Take 1 tablet by mouth Every 6 (Six) Hours As Needed for Mild Pain or Moderate Pain. 40 tablet 0    lisinopril (PRINIVIL,ZESTRIL) 40 MG tablet Take 1 tablet by mouth daily 90 tablet 1    metFORMIN ER (GLUCOPHAGE-XR) 500 MG 24 hr tablet Take 1 tablet by mouth Daily With Breakfast. 90 tablet 1    multivitamin with  "minerals tablet tablet Take 1 tablet by mouth Daily.      simvastatin (ZOCOR) 5 MG tablet Take 1 tablet by mouth Every Night. 90 tablet 1       No Known Allergies     Family History   Problem Relation Age of Onset    Diabetes type II Mother     Hypertension Father     Hypertension Paternal Grandmother        Social History     Socioeconomic History    Marital status:     Number of children: 1   Tobacco Use    Smoking status: Former     Current packs/day: 0.00     Average packs/day: 1 pack/day for 8.0 years (8.0 ttl pk-yrs)     Types: Cigarettes     Start date: 1988     Quit date:      Years since quittin.2    Smokeless tobacco: Never   Vaping Use    Vaping status: Never Used   Substance and Sexual Activity    Alcohol use: Not Currently     Comment: occassionally     Drug use: Yes     Types: Marijuana    Sexual activity: Defer       Review of Systems   Constitutional:  Negative for chills and fever.   Gastrointestinal:  Negative for abdominal distention, abdominal pain, anal bleeding, blood in stool, constipation, diarrhea and rectal pain.        Vital Signs:   Resp 16   Ht 180.3 cm (70.98\")   Wt 101 kg (223 lb 1.7 oz)   BMI 31.13 kg/m²      Physical Exam  Vitals and nursing note reviewed.   Constitutional:       General: He is not in acute distress.     Appearance: He is obese. He is not ill-appearing.   HENT:      Head: Normocephalic and atraumatic.   Cardiovascular:      Rate and Rhythm: Normal rate.   Pulmonary:      Effort: Pulmonary effort is normal.      Breath sounds: No stridor.   Abdominal:      Palpations: Abdomen is soft.      Tenderness: There is no guarding.   Musculoskeletal:         General: No deformity. Normal range of motion.   Skin:     General: Skin is warm and dry.      Coloration: Skin is not jaundiced.   Neurological:      General: No focal deficit present.      Mental Status: He is alert and oriented to person, place, and time.   Psychiatric:         Mood and Affect: " Mood normal.         Thought Content: Thought content normal.          Result Review :          []  Laboratory  []  Radiology  []  Pathology  []  Microbiology  []  EKG/Telemetry   []  Cardiology/Vascular   []  Old records  I spent 15 minutes caring for Moe on this date of service. This time includes time spent by me in the following activities: reviewing tests, obtaining and/or reviewing a separately obtained history, performing a medically appropriate examination and/or evaluation, ordering medications, tests, or procedures, and documenting information in the medical record        Assessment and Plan    Diagnoses and all orders for this visit:    1. Screening for malignant neoplasm of colon (Primary)    Other orders  -     sodium-potassium-magnesium sulfates (Suprep Bowel Prep Kit) 17.5-3.13-1.6 GM/177ML solution oral solution; Take as directed.  Instructions given in office.  Dispense: 2 bottles  Dispense: 354 mL; Refill: 0    Cardiac clearance with Greg Sousa.      Follow Up   Return for Schedule colonoscopy with Dr. Wheat on 4/29/2025 [facility].    Hospital arrival time: 0700.    Possible risks/complications, benefits, and alternatives to surgical or invasive procedures have been explained to patient and/or legal guardian.    Patient has been evaluated and can tolerate anesthesia and/or sedation. Risks, benefits, and alternatives to anesthesia and sedation have been explained to the patient and/or legal guardian. Patient verbalizes understanding and is willing to proceed with the above plan.     Patient was given instructions and counseling regarding his condition or for health maintenance advice. Please see specific information pulled into the AVS if appropriate.     As always, it has been a pleasure to participate in your patient's care. Please call with questions or concerns.

## 2025-04-08 ENCOUNTER — OFFICE VISIT (OUTPATIENT)
Dept: FAMILY MEDICINE CLINIC | Age: 55
End: 2025-04-08
Payer: COMMERCIAL

## 2025-04-08 ENCOUNTER — LAB (OUTPATIENT)
Dept: LAB | Facility: HOSPITAL | Age: 55
End: 2025-04-08
Payer: COMMERCIAL

## 2025-04-08 VITALS
DIASTOLIC BLOOD PRESSURE: 70 MMHG | HEART RATE: 60 BPM | WEIGHT: 216 LBS | BODY MASS INDEX: 30.24 KG/M2 | OXYGEN SATURATION: 96 % | TEMPERATURE: 97 F | HEIGHT: 71 IN | SYSTOLIC BLOOD PRESSURE: 113 MMHG

## 2025-04-08 DIAGNOSIS — E11.9 TYPE 2 DIABETES MELLITUS WITHOUT COMPLICATION, WITHOUT LONG-TERM CURRENT USE OF INSULIN: ICD-10-CM

## 2025-04-08 DIAGNOSIS — E78.2 MIXED HYPERLIPIDEMIA: Primary | ICD-10-CM

## 2025-04-08 LAB
ALBUMIN SERPL-MCNC: 4.5 G/DL (ref 3.5–5.2)
ALBUMIN UR-MCNC: <1.2 MG/DL
ALBUMIN/GLOB SERPL: 1.9 G/DL
ALP SERPL-CCNC: 70 U/L (ref 39–117)
ALT SERPL W P-5'-P-CCNC: 16 U/L (ref 1–41)
ANION GAP SERPL CALCULATED.3IONS-SCNC: 9.1 MMOL/L (ref 5–15)
AST SERPL-CCNC: 25 U/L (ref 1–40)
BILIRUB SERPL-MCNC: 0.8 MG/DL (ref 0–1.2)
BUN SERPL-MCNC: 14 MG/DL (ref 6–20)
BUN/CREAT SERPL: 15.2 (ref 7–25)
CALCIUM SPEC-SCNC: 9.3 MG/DL (ref 8.6–10.5)
CHLORIDE SERPL-SCNC: 104 MMOL/L (ref 98–107)
CHOLEST SERPL-MCNC: 177 MG/DL (ref 0–200)
CO2 SERPL-SCNC: 24.9 MMOL/L (ref 22–29)
CREAT SERPL-MCNC: 0.92 MG/DL (ref 0.76–1.27)
CREAT UR-MCNC: 111.9 MG/DL
EGFRCR SERPLBLD CKD-EPI 2021: 100.1 ML/MIN/1.73
GLOBULIN UR ELPH-MCNC: 2.4 GM/DL
GLUCOSE SERPL-MCNC: 149 MG/DL (ref 65–99)
HBA1C MFR BLD: 6.6 % (ref 4.8–5.6)
HDLC SERPL-MCNC: 65 MG/DL (ref 40–60)
LDLC SERPL CALC-MCNC: 103 MG/DL (ref 0–100)
LDLC/HDLC SERPL: 1.59 {RATIO}
MICROALBUMIN/CREAT UR: NORMAL MG/G{CREAT}
POTASSIUM SERPL-SCNC: 4.7 MMOL/L (ref 3.5–5.2)
PROT SERPL-MCNC: 6.9 G/DL (ref 6–8.5)
SODIUM SERPL-SCNC: 138 MMOL/L (ref 136–145)
TRIGL SERPL-MCNC: 42 MG/DL (ref 0–150)
VLDLC SERPL-MCNC: 9 MG/DL (ref 5–40)

## 2025-04-08 PROCEDURE — 83036 HEMOGLOBIN GLYCOSYLATED A1C: CPT

## 2025-04-08 PROCEDURE — 80053 COMPREHEN METABOLIC PANEL: CPT

## 2025-04-08 PROCEDURE — 82043 UR ALBUMIN QUANTITATIVE: CPT

## 2025-04-08 PROCEDURE — 82570 ASSAY OF URINE CREATININE: CPT

## 2025-04-08 PROCEDURE — 80061 LIPID PANEL: CPT

## 2025-04-08 PROCEDURE — 36415 COLL VENOUS BLD VENIPUNCTURE: CPT

## 2025-04-08 NOTE — PROGRESS NOTES
"Chief Complaint  Diabetes (6 month follow up ) and Hyperlipidemia    Subjective          Moe Page presents to [facility]     Patient is a 54-year-old male who is here today to follow-up regarding type 2 diabetes.  His last hemoglobin A1c was 6.3 and October.  He is working on healthier lifestyle measures and hopes to take as little medicine as possible.  We have decreased his dose to metformin extended release 500 mg once a daily.  Patient has had over 70 pounds of weight loss over the last couple of years.  He denies medication side effects.  Further refills pending lab results.  He had an eye exam in 2023 and will have one done this year (dr caro)    For hyperlipidemia currently taking simvastatin 5 mg at bedtime.  Is taking medicines more often but still may miss a dose occasionally.  He is currently a month ahead on his medicine refills.  He has decreased his simvastatin dose gradually over the last 1 year.  He was taking 20 mg at bedtime previously.  He hopes to be able to further decrease or discontinue medication as indicated.  Patient states he feels good denies concerns.    He will have a colonoscopy in 2 weeks.    Cardiology prescribes his lisinopril and amlodipine.     Objective   Vital Signs:   Vitals:    04/08/25 0753   BP: 113/70   BP Location: Left arm   Patient Position: Sitting   Cuff Size: Adult   Pulse: 60   Temp: 97 °F (36.1 °C)   TempSrc: Temporal   SpO2: 96%   Weight: 98 kg (216 lb)   Height: 180.3 cm (70.98\")       Wt Readings from Last 3 Encounters:   04/08/25 98 kg (216 lb)   03/20/25 101 kg (223 lb 1.7 oz)   01/08/25 98.4 kg (217 lb)      BP Readings from Last 3 Encounters:   04/08/25 113/70   01/08/25 129/84   10/08/24 136/84       Body mass index is 30.14 kg/m².           Physical Exam  Vitals reviewed.   Constitutional:       General: He is not in acute distress.     Appearance: Normal appearance. He is well-developed.   Neck:      Thyroid: " No thyromegaly.      Vascular: No carotid bruit.   Cardiovascular:      Rate and Rhythm: Normal rate and regular rhythm.      Pulses:           Dorsalis pedis pulses are 2+ on the right side and 2+ on the left side.        Posterior tibial pulses are 2+ on the right side and 2+ on the left side.      Heart sounds: Normal heart sounds.   Pulmonary:      Effort: Pulmonary effort is normal.      Breath sounds: Normal breath sounds.   Musculoskeletal:      Right lower leg: No edema.      Left lower leg: No edema.   Feet:      Right foot:      Protective Sensation: 3 sites tested.  3 sites sensed.      Skin integrity: Skin integrity normal. No ulcer or blister.      Toenail Condition: Right toenails are normal.      Left foot:      Protective Sensation: 3 sites tested.  3 sites sensed.      Skin integrity: Skin integrity normal. No ulcer or blister.      Toenail Condition: Left toenails are normal.      Comments:      Skin:     General: Skin is warm and dry.   Neurological:      General: No focal deficit present.      Mental Status: He is alert.   Psychiatric:         Attention and Perception: Attention normal.         Mood and Affect: Mood and affect normal.         Behavior: Behavior normal.           Current Outpatient Medications:     albuterol sulfate  (90 Base) MCG/ACT inhaler, Inhale 2 puffs Every 4 (Four) Hours As Needed for Wheezing or Shortness of Air., Disp: 6.7 g, Rfl: 0    amLODIPine (NORVASC) 5 MG tablet, Take 1 tablet by mouth daily, Disp: 90 tablet, Rfl: 1    ibuprofen (ADVIL,MOTRIN) 800 MG tablet, Take 1 tablet by mouth Every 6 (Six) Hours As Needed for Mild Pain or Moderate Pain., Disp: 40 tablet, Rfl: 0    lisinopril (PRINIVIL,ZESTRIL) 40 MG tablet, Take 1 tablet by mouth daily, Disp: 90 tablet, Rfl: 1    metFORMIN ER (GLUCOPHAGE-XR) 500 MG 24 hr tablet, Take 1 tablet by mouth Daily With Breakfast., Disp: 90 tablet, Rfl: 1    multivitamin with minerals tablet tablet, Take 1 tablet by mouth  Daily., Disp: , Rfl:     simvastatin (ZOCOR) 5 MG tablet, Take 1 tablet by mouth Every Night., Disp: 90 tablet, Rfl: 1    sodium-potassium-magnesium sulfates (Suprep Bowel Prep Kit) 17.5-3.13-1.6 GM/177ML solution oral solution, Take as directed with instructions given in office., Disp: 354 mL, Rfl: 0   Past Medical History:   Diagnosis Date    Abnormal results of liver function studies     COVID-19 10/2020    Enlarged aorta     Essential (primary) hypertension     Mixed hyperlipidemia     Obstructive sleep apnea (adult) (pediatric)     SLEEP STUDY    Type 2 diabetes mellitus with hyperglycemia     Vitamin D deficiency      No Known Allergies            Result Review :     Common labs          10/8/2024    09:39 2025    08:47 2025    09:19   Common Labs   Glucose 134  149     BUN 10  14     Creatinine 0.85  0.92     Sodium 137  138     Potassium 4.6  4.7     Chloride 103  104     Calcium 9.6  9.3     Albumin 4.4  4.5     Total Bilirubin 1.3  0.8     Alkaline Phosphatase 75  70     AST (SGOT) 26  25     ALT (SGPT) 21  16     Total Cholesterol 186  177     Triglycerides 49  42     HDL Cholesterol 61  65     LDL Cholesterol  116  103     Hemoglobin A1C 6.30  6.60     Microalbumin, Urine   <1.2    PSA 0.271           XR Elbow 2 View Right  Result Date: 2025  1.Enthesophyte formation at the olecranon with lucency at the distal enthesophyte which could be related to acute or chronic injury. Correlate for symptoms in this location. 2.No other radiographic findings of acute osseous elbow abnormality. 3.Mild osteoarthritis. Electronically Signed: Jovan Dunlap  2025 3:57 PM EST  Workstation ID: [id number]             Social History     Tobacco Use   Smoking Status Former    Current packs/day: 0.00    Average packs/day: 1 pack/day for 8.0 years (8.0 ttl pk-yrs)    Types: Cigarettes    Start date: 1988    Quit date:     Years since quittin.2   Smokeless Tobacco Never           Assessment and Plan     Diagnoses and all orders for this visit:    1. Mixed hyperlipidemia (Primary)  -     simvastatin (ZOCOR) 5 MG tablet; Take 1 tablet by mouth Every Night.  Dispense: 90 tablet; Refill: 1    2. Type 2 diabetes mellitus without complication, without long-term current use of insulin  -     metFORMIN ER (GLUCOPHAGE-XR) 500 MG 24 hr tablet; Take 1 tablet by mouth Daily With Breakfast.  Dispense: 90 tablet; Refill: 1        Follow Up    No follow-ups on file.  Patient was given instructions and counseling regarding his condition or for health maintenance advice. Please see specific information pulled into the AVS if appropriate.

## 2025-04-09 RX ORDER — METFORMIN HYDROCHLORIDE 500 MG/1
500 TABLET, EXTENDED RELEASE ORAL
Qty: 90 TABLET | Refills: 1 | Status: SHIPPED | OUTPATIENT
Start: 2025-04-09

## 2025-04-09 RX ORDER — SIMVASTATIN 5 MG
5 TABLET ORAL NIGHTLY
Qty: 90 TABLET | Refills: 1 | Status: SHIPPED | OUTPATIENT
Start: 2025-04-09

## 2025-04-09 NOTE — ASSESSMENT & PLAN NOTE
Lab results show that things are stable regarding diabetes and cholesterol control.  I would not recommend decreasing or discontinuing either metformin or simvastatin at this time.  Follow-up in 6 months or sooner if concerns.

## 2025-04-17 ENCOUNTER — TELEPHONE (OUTPATIENT)
Dept: SURGERY | Facility: CLINIC | Age: 55
End: 2025-04-17
Payer: COMMERCIAL

## 2025-04-21 NOTE — PRE-PROCEDURE INSTRUCTIONS
Reminded of arrival time at 0700 , Entrance C of the [facility] hospital. Instructed to bring or have a  over the age of 18 set up to drive you home the day of procedure.    Instructed on clear liquid diet the day before, nothing red or purple. Call with any questions about the prep or if in need of the prep.  Reminded them not to eat or drink anything am of procedure unless its a sip of water with medications.  Hold ibuprofen, metformin, lisinopril am of procedure. Use/bring inhalers am of procedure. Patient verbalized understanding.

## 2025-04-28 ENCOUNTER — ANESTHESIA EVENT (OUTPATIENT)
Dept: GASTROENTEROLOGY | Facility: HOSPITAL | Age: 55
End: 2025-04-28
Payer: COMMERCIAL

## 2025-04-28 NOTE — ANESTHESIA PREPROCEDURE EVALUATION
Anesthesia Evaluation     Patient summary reviewed and Nursing notes reviewed   NPO Solid Status: > 8 hours  NPO Liquid Status: > 4 hours           Airway   Mallampati: I  TM distance: >3 FB  Neck ROM: full  No difficulty expected  Dental - normal exam     Pulmonary     breath sounds clear to auscultation  (+) a smoker (former) Former, cigarettes, asthma (mild, seasonal , inhaler prn),sleep apnea (resolved d/t wt loss)  Cardiovascular - normal exam  Exercise tolerance: good (4-7 METS)    Rhythm: regular  Rate: normal    (+) hypertension well controlled, hyperlipidemia      Neuro/Psych  GI/Hepatic/Renal/Endo    (+) obesity, morbid obesity, diabetes mellitus type 2 well controlled    Musculoskeletal     Abdominal    Substance History   (+) drug use (marijuana regular use, took gummy last night at 5pm)     OB/GYN          Other        ROS/Med Hx Other: screening    CARDIAC CLEARANCE ON CHART 04/04/2025 PER LUPILLO HERNANDEZ.    LAST OV 02/2024--RETURN IN 2-YRS (SEE OFFICE NOTE BELOW)  Pt completed echocardiogram prior to office visit demonstrating largely normal study ejection fraction 55%, aortic root is stable at 3.8 cm upper limit of normal, previously maximum dilatation of 4.0 cm on CT of the chest back in 2021. Patient has lost a tremendous amount of weight and blood pressure is normotensive, will continue current plan of care patient can return for routine follow-up within 2 years.        Phys Exam Other: Full beard               Anesthesia Plan    ASA 3     general   total IV anesthesia  (Total IV Anesthesia    Patient understands anesthesia not responsible for dental damage.      Discussed risks with pt including aspiration, allergic reactions, apnea, advanced airway placement. Pt verbalized understanding. All questions answered.   )  intravenous induction     Anesthetic plan, risks, benefits, and alternatives have been provided, discussed and informed consent has been obtained with: patient and  spouse/significant other.  Pre-procedure education provided  Plan discussed with CRNA.      CODE STATUS:

## 2025-04-29 ENCOUNTER — HOSPITAL ENCOUNTER (OUTPATIENT)
Facility: HOSPITAL | Age: 55
Setting detail: HOSPITAL OUTPATIENT SURGERY
Discharge: HOME | End: 2025-04-29
Attending: SURGERY | Admitting: SURGERY
Payer: COMMERCIAL

## 2025-04-29 ENCOUNTER — ANESTHESIA (OUTPATIENT)
Dept: GASTROENTEROLOGY | Facility: HOSPITAL | Age: 55
End: 2025-04-29
Payer: COMMERCIAL

## 2025-04-29 VITALS
RESPIRATION RATE: 12 BRPM | BODY MASS INDEX: 29.75 KG/M2 | DIASTOLIC BLOOD PRESSURE: 80 MMHG | OXYGEN SATURATION: 97 % | HEIGHT: 71 IN | WEIGHT: 212.52 LBS | TEMPERATURE: 97.3 F | SYSTOLIC BLOOD PRESSURE: 104 MMHG | HEART RATE: 52 BPM

## 2025-04-29 DIAGNOSIS — Z12.11 SCREENING FOR MALIGNANT NEOPLASM OF COLON: ICD-10-CM

## 2025-04-29 LAB — GLUCOSE BLDC GLUCOMTR-MCNC: 146 MG/DL (ref 70–99)

## 2025-04-29 PROCEDURE — 82948 REAGENT STRIP/BLOOD GLUCOSE: CPT | Performed by: NURSE ANESTHETIST, CERTIFIED REGISTERED

## 2025-04-29 PROCEDURE — 25010000002 PROPOFOL 10 MG/ML EMULSION: Performed by: NURSE ANESTHETIST, CERTIFIED REGISTERED

## 2025-04-29 PROCEDURE — 88305 TISSUE EXAM BY PATHOLOGIST: CPT | Performed by: SURGERY

## 2025-04-29 PROCEDURE — 25810000003 LACTATED RINGERS PER 1000 ML: Performed by: NURSE ANESTHETIST, CERTIFIED REGISTERED

## 2025-04-29 PROCEDURE — 25010000002 LIDOCAINE PF 2% 2 % SOLUTION: Performed by: NURSE ANESTHETIST, CERTIFIED REGISTERED

## 2025-04-29 RX ORDER — SODIUM CHLORIDE 0.9 % (FLUSH) 0.9 %
10 SYRINGE (ML) INJECTION AS NEEDED
Status: DISCONTINUED | OUTPATIENT
Start: 2025-04-29 | End: 2025-04-29 | Stop reason: HOSPADM

## 2025-04-29 RX ORDER — SODIUM CHLORIDE 9 MG/ML
40 INJECTION, SOLUTION INTRAVENOUS AS NEEDED
Status: DISCONTINUED | OUTPATIENT
Start: 2025-04-29 | End: 2025-04-29 | Stop reason: HOSPADM

## 2025-04-29 RX ORDER — PROPOFOL 10 MG/ML
VIAL (ML) INTRAVENOUS CONTINUOUS PRN
Status: DISCONTINUED | OUTPATIENT
Start: 2025-04-29 | End: 2025-04-29 | Stop reason: SURG

## 2025-04-29 RX ORDER — LIDOCAINE HYDROCHLORIDE 20 MG/ML
INJECTION, SOLUTION EPIDURAL; INFILTRATION; INTRACAUDAL; PERINEURAL AS NEEDED
Status: DISCONTINUED | OUTPATIENT
Start: 2025-04-29 | End: 2025-04-29 | Stop reason: SURG

## 2025-04-29 RX ORDER — SODIUM CHLORIDE 0.9 % (FLUSH) 0.9 %
3 SYRINGE (ML) INJECTION EVERY 12 HOURS SCHEDULED
Status: DISCONTINUED | OUTPATIENT
Start: 2025-04-29 | End: 2025-04-29 | Stop reason: HOSPADM

## 2025-04-29 RX ORDER — SODIUM CHLORIDE, SODIUM LACTATE, POTASSIUM CHLORIDE, CALCIUM CHLORIDE 600; 310; 30; 20 MG/100ML; MG/100ML; MG/100ML; MG/100ML
30 INJECTION, SOLUTION INTRAVENOUS CONTINUOUS
Status: DISCONTINUED | OUTPATIENT
Start: 2025-04-29 | End: 2025-04-29 | Stop reason: HOSPADM

## 2025-04-29 RX ADMIN — PROPOFOL 100 MG: 10 INJECTION, EMULSION INTRAVENOUS at 08:41

## 2025-04-29 RX ADMIN — LIDOCAINE HYDROCHLORIDE 30 MG: 20 INJECTION, SOLUTION EPIDURAL; INFILTRATION; INTRACAUDAL; PERINEURAL at 08:41

## 2025-04-29 RX ADMIN — SODIUM CHLORIDE, POTASSIUM CHLORIDE, SODIUM LACTATE AND CALCIUM CHLORIDE 30 ML/HR: 600; 310; 30; 20 INJECTION, SOLUTION INTRAVENOUS at 07:16

## 2025-04-29 RX ADMIN — PROPOFOL 200 MCG/KG/MIN: 10 INJECTION, EMULSION INTRAVENOUS at 08:41

## 2025-04-29 NOTE — ANESTHESIA POSTPROCEDURE EVALUATION
Patient: Moe Page    Procedure Summary       Date: 04/29/25 Room / Location: [facility] ENDOSCOPY 1 / [facility] ENDOSCOPY    Anesthesia Start: 0837 Anesthesia Stop: 0900    Procedure: COLONOSCOPY WITH POLYPECTOMY Diagnosis:       Screening for malignant neoplasm of colon      (Screening for malignant neoplasm of colon [Z12.11])    Surgeons: Jovan Wheat MD Provider: Laith Powell CRNA    Anesthesia Type: general ASA Status: 3            Anesthesia Type: general    Vitals  Vitals Value Taken Time   /80 04/29/25 09:16   Temp 36.3 °C (97.3 °F) 04/29/25 09:16   Pulse 52 04/29/25 09:16   Resp 12 04/29/25 09:16   SpO2 97 % 04/29/25 09:16           Post Anesthesia Care and Evaluation    Post-procedure mental status: acceptable.  Pain management: satisfactory to patient    Airway patency: patent  Anesthetic complications: No anesthetic complications    Cardiovascular status: acceptable  Respiratory status: acceptable    Comments: Per chart review

## 2025-04-29 NOTE — H&P
General Surgery/Colorectal Surgery Note    Patient Name:  Moe Page  YOB: 1970  [id number]    Referring Provider: Jovan Wheat, *      Patient Care Team:  Rohcelle Louis APRN as PCP - General (Nurse Practitioner)  Joselito Perez DC (Chiropractic Medicine)  Greg Sousa APRN (Family Medicine)    Subjective .     History of present illness:    HPI   referral from Rochelle Drew for colonoscopy consultation. Patient denies any abdominal pain, change in bowel habit, or rectal bleeding. Denies any family history of colorectal cancer. No previous colonoscopy.     History:  Past Medical History:   Diagnosis Date    Abnormal results of liver function studies     COVID-19 10/2020    Enlarged aorta     Essential (primary) hypertension     Mixed hyperlipidemia     Obstructive sleep apnea (adult) (pediatric)     SLEEP STUDY    Type 2 diabetes mellitus with hyperglycemia     Vitamin D deficiency        Past Surgical History:   Procedure Laterality Date    FOOT FRACTURE SURGERY Left 1999    ROTATOR CUFF REPAIR  2006       Family History   Problem Relation Age of Onset    Diabetes type II Mother     Hypertension Father     Hypertension Paternal Grandmother        Social History     Tobacco Use    Smoking status: Former     Current packs/day: 0.00     Average packs/day: 1 pack/day for 8.0 years (8.0 ttl pk-yrs)     Types: Cigarettes     Start date: 1988     Quit date:      Years since quittin.3    Smokeless tobacco: Never   Vaping Use    Vaping status: Never Used   Substance Use Topics    Alcohol use: Not Currently     Comment: occassionally     Drug use: Yes     Types: Marijuana       Review of Systems: See HPI    Review of Systems            Medications Prior to Admission   Medication Sig Dispense Refill Last Dose/Taking    metFORMIN ER (GLUCOPHAGE-XR) 500 MG 24 hr tablet Take 1 tablet by mouth Daily With Breakfast. 90 tablet 1 2025    multivitamin with  minerals tablet tablet Take 1 tablet by mouth Daily.   4/28/2025    simvastatin (ZOCOR) 5 MG tablet Take 1 tablet by mouth Every Night. 90 tablet 1 4/28/2025    albuterol sulfate  (90 Base) MCG/ACT inhaler Inhale 2 puffs Every 4 (Four) Hours As Needed for Wheezing or Shortness of Air. 6.7 g 0 More than a month    ibuprofen (ADVIL,MOTRIN) 800 MG tablet Take 1 tablet by mouth Every 6 (Six) Hours As Needed for Mild Pain or Moderate Pain. 40 tablet 0 More than a month         Home Meds:      Prior to Admission medications    Medication Sig Start Date End Date Taking? Authorizing Provider   metFORMIN ER (GLUCOPHAGE-XR) 500 MG 24 hr tablet Take 1 tablet by mouth Daily With Breakfast. 4/9/25  Yes Rochelle Louis APRN   multivitamin with minerals tablet tablet Take 1 tablet by mouth Daily.   Yes Provider, MD Marty   simvastatin (ZOCOR) 5 MG tablet Take 1 tablet by mouth Every Night. 4/9/25  Yes Rochelle Louis APRN   albuterol sulfate  (90 Base) MCG/ACT inhaler Inhale 2 puffs Every 4 (Four) Hours As Needed for Wheezing or Shortness of Air. 4/29/24   Tara Johnson APRN   ibuprofen (ADVIL,MOTRIN) 800 MG tablet Take 1 tablet by mouth Every 6 (Six) Hours As Needed for Mild Pain or Moderate Pain. 1/8/25   Radha Laguerre APRN   amLODIPine (NORVASC) 5 MG tablet Take 1 tablet by mouth daily 8/12/24 4/22/25     lisinopril (PRINIVIL,ZESTRIL) 40 MG tablet Take 1 tablet by mouth daily 8/12/24 4/22/25     sodium-potassium-magnesium sulfates (Suprep Bowel Prep Kit) 17.5-3.13-1.6 GM/177ML solution oral solution Take as directed with instructions given in office. 3/20/25 4/29/25  Denis, April, APRN            Allergies:  Patient has no known allergies.      Objective     Vital Signs   Temp:  [96.7 °F (35.9 °C)] 96.7 °F (35.9 °C)  Heart Rate:  [60] 60  Resp:  [12] 12  BP: (124)/(77) 124/77    Physical Exam:     Physical Exam    NAD, A/O x 4, normal circulation, normal respiration      Result Review :Labs  Result  Review  Imaging  Med Tab  Media    Assessment & Plan     Diagnoses and all orders for this visit:    1. Screening for malignant neoplasm of colon  -     sodium chloride 0.9 % flush 3 mL  -     sodium chloride 0.9 % flush 10 mL  -     sodium chloride 0.9 % infusion 40 mL    Other orders  -     Continuous Pulse Oximetry; Standing  -     POC Glucose Once; Standing  -     Insert Peripheral IV; Standing  -     lactated ringers infusion  -     Follow Anesthesia Guidelines / Protocol; Standing  -     Verify Bowel Prep Was Successful; Standing  -     Give Tap Water Enema If Bowel Prep Insufficient; Standing  -     Insert Peripheral IV; Standing  -     Saline Lock & Maintain IV Access; Standing  -     Place Sequential Compression Device; Standing  -     Maintain Sequential Compression Device; Standing  -     Continuous Pulse Oximetry  -     POC Glucose Once  -     Insert Peripheral IV  -     Follow Anesthesia Guidelines / Protocol  -     Verify Bowel Prep Was Successful  -     Give Tap Water Enema If Bowel Prep Insufficient  -     Insert Peripheral IV  -     Saline Lock & Maintain IV Access  -     Place Sequential Compression Device  -     Maintain Sequential Compression Device           Risks including bleeding, perforation, pain, infection, missed polyp(s). Benefits, and alternatives of Colonoscopy discussed with patient.  All questions answered.  Consent verified.         Jovan Wheat MD  04/29/25 07:46 EDT

## 2025-04-30 LAB
CYTO UR: NORMAL
LAB AP CASE REPORT: NORMAL
LAB AP CLINICAL INFORMATION: NORMAL
PATH REPORT.FINAL DX SPEC: NORMAL
PATH REPORT.GROSS SPEC: NORMAL

## 2025-05-14 ENCOUNTER — TELEPHONE (OUTPATIENT)
Dept: SURGERY | Facility: CLINIC | Age: 55
End: 2025-05-14
Payer: COMMERCIAL

## 2025-05-14 NOTE — TELEPHONE ENCOUNTER
----- Message -----  From: Denis, April, APRN  Sent: 5/8/2025   9:03 AM EDT  To: Rhona Fisher    5 year colon recall.  Mb    Date completed:  04/29/2025  Expected date for next c-scope:  04/29/2030    Care Gap and recall placed and updated.

## 2025-05-22 ENCOUNTER — TELEPHONE (OUTPATIENT)
Dept: SURGERY | Facility: CLINIC | Age: 55
End: 2025-05-22
Payer: COMMERCIAL

## 2025-05-22 NOTE — TELEPHONE ENCOUNTER
CALLED PT TO RS CX APPT FROM 5/20 W/APRIL/SPOKE W/PT AND PT DOES NOT WANT TO RS APPT/ANYTHING ELSE TO DO??/AUTUMN PUT IN RECALL

## (undated) DEVICE — SOLIDIFIER LIQLOC PLS 1500CC BT

## (undated) DEVICE — STERILE POLYISOPRENE POWDER-FREE SURGICAL GLOVES: Brand: PROTEXIS

## (undated) DEVICE — STERILE POLYISOPRENE POWDER-FREE SURGICAL GLOVES WITH EMOLLIENT COATING: Brand: PROTEXIS

## (undated) DEVICE — THE STERILE LIGHT HANDLE COVER IS USED WITH STERIS SURGICAL LIGHTING AND VISUALIZATION SYSTEMS.

## (undated) DEVICE — SNAR E/S POLYP SNAREMASTER OVL/10MM 2.8X2300MM YEL

## (undated) DEVICE — SOL IRR H2O BO 1000ML STRL

## (undated) DEVICE — Device

## (undated) DEVICE — CONN JET HYDRA H20 AUXILIARY DISP

## (undated) DEVICE — SOL IRRG H2O PL/BG 1000ML STRL

## (undated) DEVICE — LINER SURG CANSTR SXN S/RIGD 1500CC

## (undated) DEVICE — DEFENDO AIR WATER SUCTION AND BIOPSY VALVE KIT FOR  OLYMPUS: Brand: DEFENDO AIR/WATER/SUCTION AND BIOPSY VALVE

## (undated) DEVICE — THE SINGLE USE ETRAP – POLYP TRAP IS USED FOR SUCTION RETRIEVAL OF ENDOSCOPICALLY REMOVED POLYPS.: Brand: ETRAP

## (undated) DEVICE — TUBING, SUCTION, 1/4" X 10', STRAIGHT: Brand: MEDLINE